# Patient Record
Sex: MALE | Race: BLACK OR AFRICAN AMERICAN | NOT HISPANIC OR LATINO | URBAN - METROPOLITAN AREA
[De-identification: names, ages, dates, MRNs, and addresses within clinical notes are randomized per-mention and may not be internally consistent; named-entity substitution may affect disease eponyms.]

---

## 2023-09-27 PROBLEM — M51.9 LUMBAR DISC DISEASE: Status: ACTIVE | Noted: 2023-09-27

## 2023-09-27 PROBLEM — M25.562 KNEE PAIN, BILATERAL: Status: ACTIVE | Noted: 2023-09-27

## 2023-09-27 PROBLEM — M16.0 PRIMARY LOCALIZED OSTEOARTHRITIS OF HIPS, BILATERAL: Status: ACTIVE | Noted: 2023-09-27

## 2023-09-27 PROBLEM — M17.0 PRIMARY OSTEOARTHRITIS OF BOTH KNEES: Status: ACTIVE | Noted: 2023-09-27

## 2023-09-27 PROBLEM — M25.561 KNEE PAIN, BILATERAL: Status: ACTIVE | Noted: 2023-09-27

## 2023-09-27 PROBLEM — M54.16 LUMBAR RADICULOPATHY: Status: ACTIVE | Noted: 2023-09-27

## 2023-09-27 RX ORDER — METHYLPREDNISOLONE 4 MG/1
TABLET ORAL
COMMUNITY
Start: 2022-07-27 | End: 2024-05-17 | Stop reason: WASHOUT

## 2023-09-27 RX ORDER — BLOOD SUGAR DIAGNOSTIC
STRIP MISCELLANEOUS
COMMUNITY
Start: 2021-05-18 | End: 2024-05-17 | Stop reason: WASHOUT

## 2023-09-27 RX ORDER — AMOXICILLIN AND CLAVULANATE POTASSIUM 875; 125 MG/1; MG/1
TABLET, FILM COATED ORAL
COMMUNITY
Start: 2019-12-23 | End: 2024-05-17 | Stop reason: WASHOUT

## 2023-09-27 RX ORDER — AMLODIPINE BESYLATE 10 MG/1
1 TABLET ORAL DAILY
COMMUNITY
Start: 2019-11-05

## 2023-09-27 RX ORDER — LANCETS 33 GAUGE
EACH MISCELLANEOUS
COMMUNITY
End: 2024-05-17 | Stop reason: WASHOUT

## 2023-09-27 RX ORDER — GUAIFENESIN 600 MG/1
600 TABLET, EXTENDED RELEASE ORAL 2 TIMES DAILY PRN
Status: ON HOLD | COMMUNITY
Start: 2019-12-23 | End: 2024-05-18

## 2023-09-27 RX ORDER — ACETAMINOPHEN 325 MG/1
975 TABLET ORAL EVERY 8 HOURS PRN
COMMUNITY
Start: 2019-12-23

## 2023-09-27 RX ORDER — PEN NEEDLE, DIABETIC 30 GX3/16"
NEEDLE, DISPOSABLE MISCELLANEOUS
COMMUNITY
End: 2024-05-17 | Stop reason: WASHOUT

## 2023-09-27 RX ORDER — IBUPROFEN 200 MG
400 TABLET ORAL EVERY 6 HOURS PRN
COMMUNITY
Start: 2019-09-19

## 2023-09-27 RX ORDER — SILDENAFIL 100 MG/1
100 TABLET, FILM COATED ORAL DAILY PRN
COMMUNITY
Start: 2019-12-04

## 2023-09-27 RX ORDER — LANOLIN ALCOHOL/MO/W.PET/CERES
400 CREAM (GRAM) TOPICAL DAILY
COMMUNITY
Start: 2020-12-30

## 2023-09-27 RX ORDER — MELOXICAM 15 MG/1
1 TABLET ORAL DAILY PRN
COMMUNITY
Start: 2022-06-01

## 2023-09-27 RX ORDER — ISOPROPYL ALCOHOL 70 ML/100ML
SWAB TOPICAL
COMMUNITY
End: 2024-05-17 | Stop reason: WASHOUT

## 2023-09-27 RX ORDER — LOSARTAN POTASSIUM 25 MG/1
25 TABLET ORAL DAILY
COMMUNITY
Start: 2020-12-30

## 2023-09-27 RX ORDER — METFORMIN HYDROCHLORIDE 1000 MG/1
TABLET ORAL
COMMUNITY
Start: 2020-03-19 | End: 2024-05-17 | Stop reason: WASHOUT

## 2023-09-27 RX ORDER — ATORVASTATIN CALCIUM 40 MG/1
40 TABLET, FILM COATED ORAL DAILY
COMMUNITY
Start: 2019-11-05

## 2023-09-27 RX ORDER — TIZANIDINE 4 MG/1
4 TABLET ORAL EVERY 6 HOURS PRN
COMMUNITY
Start: 2022-06-01 | End: 2024-05-17 | Stop reason: WASHOUT

## 2023-09-27 RX ORDER — LIRAGLUTIDE 6 MG/ML
1.8 INJECTION SUBCUTANEOUS DAILY
COMMUNITY
Start: 2021-02-09

## 2023-09-27 RX ORDER — CYCLOBENZAPRINE HCL 10 MG
10 TABLET ORAL 3 TIMES DAILY PRN
COMMUNITY
Start: 2019-09-19

## 2023-09-27 RX ORDER — GLIPIZIDE 5 MG/1
5 TABLET ORAL
COMMUNITY
Start: 2020-12-14

## 2023-10-02 ENCOUNTER — APPOINTMENT (OUTPATIENT)
Dept: ORTHOPEDIC SURGERY | Facility: HOSPITAL | Age: 58
End: 2023-10-02
Payer: COMMERCIAL

## 2024-01-22 ENCOUNTER — OFFICE VISIT (OUTPATIENT)
Dept: ORTHOPEDIC SURGERY | Facility: HOSPITAL | Age: 59
End: 2024-01-22
Payer: COMMERCIAL

## 2024-01-22 DIAGNOSIS — M17.0 PRIMARY OSTEOARTHRITIS OF BOTH KNEES: Primary | ICD-10-CM

## 2024-01-22 PROCEDURE — 2500000004 HC RX 250 GENERAL PHARMACY W/ HCPCS (ALT 636 FOR OP/ED): Performed by: PHYSICIAN ASSISTANT

## 2024-01-22 PROCEDURE — 2500000005 HC RX 250 GENERAL PHARMACY W/O HCPCS: Performed by: PHYSICIAN ASSISTANT

## 2024-01-22 PROCEDURE — 99214 OFFICE O/P EST MOD 30 MIN: CPT | Performed by: PHYSICIAN ASSISTANT

## 2024-01-31 PROCEDURE — 20610 DRAIN/INJ JOINT/BURSA W/O US: CPT | Performed by: PHYSICIAN ASSISTANT

## 2024-01-31 RX ORDER — LIDOCAINE HYDROCHLORIDE 10 MG/ML
5 INJECTION INFILTRATION; PERINEURAL
Status: COMPLETED | OUTPATIENT
Start: 2024-01-31 | End: 2024-01-31

## 2024-01-31 RX ORDER — TRIAMCINOLONE ACETONIDE 40 MG/ML
1 INJECTION, SUSPENSION INTRA-ARTICULAR; INTRAMUSCULAR
Status: COMPLETED | OUTPATIENT
Start: 2024-01-31 | End: 2024-01-31

## 2024-01-31 RX ADMIN — LIDOCAINE HYDROCHLORIDE 5 ML: 10 INJECTION, SOLUTION INFILTRATION; PERINEURAL at 17:41

## 2024-01-31 RX ADMIN — TRIAMCINOLONE ACETONIDE 1 ML: 40 INJECTION, SUSPENSION INTRA-ARTICULAR; INTRAMUSCULAR at 17:41

## 2024-01-31 NOTE — PROGRESS NOTES
Patient is a 58 y.o. male with medical history significant for   Patient Active Problem List   Diagnosis    Knee pain, bilateral    Lumbar disc disease    Lumbar radiculopathy    Primary localized osteoarthritis of hips, bilateral    Primary osteoarthritis of both knees    who presents for follow up of his bilateral knee pain due to arthritis.  Patient states that this has caused pain for several years with symptoms continuing to worsen.  The patient rates this pain 8/10 on the pain scale and states the symptoms are better with rest and cortisone injections; worse with prolonged activities including walking, standing, stair climbing, but he does remain as active as he can with his adopted young children.  The patient denies recent injury or trauma to the knees,  denies locking or catching, denies fever/chills, N/T or calf pain. The patient has had cortisone injections in the past with good relief of their symptoms.     ROS: All other systems have been reviewed and are negative except as previously noted in history of present illness.    Gen: The patient is alert and oriented ×3, is in no acute distress, and appear their stated age and weight.  Psychiatric: Mood and affect are appropriate.  Eyes: Sclera are white, and pupils are round and symmetric.  ENT: Mucous membranes are moist.   Neck: Supple. Thyroid is midline.  Respiratory: Respirations are nonlabored, chest rise is symmetric.  Cardiac: Rate is regular by palpation of distal pulses.   Abdomen: Nondistended.  Integument: No obvious cutaneous lesions are noted. No signs of lymphangitis. No signs of systemic edema.    Musculoskeletal: BAYRON knees  skin intact, no wounds or breakdown noted  mild lower leg edema  TTP joint line lateral knees, L worse than R with valgus angulation  no knee effusion noted  compartments soft  no calf tenderness  sensation intact to light touch  motor intact including TA/GS/EHL  palpable DP/PT pulses 2+  stable to valgus/varus stress  exams at 30 degrees of flexion  negative Lachmans and posterior drawer  Positive patellar crepitus  knee motion: 3-110 degrees     XR: Images of BAYRON knee reviewed personally by me today and reveal tricompartmental arthritis with joint space narrowing noted in lateral compartment, osteophyte formation and valgus angulation. The patient's recent knee imaging can be classified as a Grade 4 on the Kellgren Zi Scale for radiographic classification of osteoarthritis. Grade 4 is severe knee OA with large osteophytes, marked narrowing of joint space, severe sclerosis and definite deformity of bone ends.     IMP:  Problem List Items Addressed This Visit       Primary osteoarthritis of both knees - Primary          DISCUSSION: I discussed the conservative treatment options for osteoarthritis including physical therapy, NSAIDs and corticosteroid injection and briefly discussed indications for surgery. Patient should try to delay joint replacement surgery for as long as possible. If the patient's joint problem affects their quality of life and cause significant restriction of their activities, they may want to consider joint replacement surgery. I reviewed the importance for adopting a low impact lifestyle and avoidance of joint overloading activities. I explained the risks and benefits of the injection.  Patient verbalized understanding and wishes to proceed with cortisone injection for the bilateral knees again today.     Patient ID: Winston Garvin Jr. is a 58 y.o. male.    L Inj/Asp: R knee on 1/31/2024 5:41 PM  Indications: pain  Details: 21 G needle, anterolateral approach  Medications: 5 mL lidocaine 10 mg/mL (1 %); 1 mL triamcinolone acetonide 40 mg/mL  Outcome: tolerated well, no immediate complications  Procedure, treatment alternatives, risks and benefits explained, specific risks discussed. Consent was given by the patient. Immediately prior to procedure a time out was called to verify the correct patient,  procedure, equipment, support staff and site/side marked as required. Patient was prepped and draped in the usual sterile fashion.       L Inj/Asp: L knee on 1/31/2024 5:41 PM  Indications: pain  Details: 21 G needle, anterolateral approach  Medications: 5 mL lidocaine 10 mg/mL (1 %); 1 mL triamcinolone acetonide 40 mg/mL  Outcome: tolerated well, no immediate complications  Consent was given by the patient. Immediately prior to procedure a time out was called to verify the correct patient, procedure, equipment, support staff and site/side marked as required. Patient was prepped and draped in the usual sterile fashion.         PLAN:  Patient should observe for signs of infection and/or adverse reactions (redness, significant increase in pain, fever, nausea or vomiting) after receiving an injection today. If you develop any of the above symptoms, please contact my office. Patient should see the maximum effect in 3 to 5 days and can receive another cortisone injection no sooner than 3 months from today. Patient will continue with activities as tolerated. Mobilize to prevent stiffness. Take Tylenol as needed.  Follow up in 3 months, no x-rays needed. All questions were answered.

## 2024-04-22 ENCOUNTER — APPOINTMENT (OUTPATIENT)
Dept: ORTHOPEDIC SURGERY | Facility: HOSPITAL | Age: 59
End: 2024-04-22
Payer: COMMERCIAL

## 2024-04-23 ENCOUNTER — OFFICE VISIT (OUTPATIENT)
Dept: ORTHOPEDIC SURGERY | Facility: HOSPITAL | Age: 59
End: 2024-04-23
Payer: COMMERCIAL

## 2024-04-23 DIAGNOSIS — M17.0 PRIMARY OSTEOARTHRITIS OF BOTH KNEES: Primary | ICD-10-CM

## 2024-04-23 PROCEDURE — 20610 DRAIN/INJ JOINT/BURSA W/O US: CPT | Mod: LT | Performed by: PHYSICIAN ASSISTANT

## 2024-04-23 PROCEDURE — 2500000005 HC RX 250 GENERAL PHARMACY W/O HCPCS: Performed by: PHYSICIAN ASSISTANT

## 2024-04-23 PROCEDURE — 99214 OFFICE O/P EST MOD 30 MIN: CPT | Performed by: PHYSICIAN ASSISTANT

## 2024-04-23 PROCEDURE — 2500000004 HC RX 250 GENERAL PHARMACY W/ HCPCS (ALT 636 FOR OP/ED): Performed by: PHYSICIAN ASSISTANT

## 2024-04-23 PROCEDURE — 20610 DRAIN/INJ JOINT/BURSA W/O US: CPT | Mod: RT | Performed by: PHYSICIAN ASSISTANT

## 2024-05-08 PROCEDURE — 20610 DRAIN/INJ JOINT/BURSA W/O US: CPT | Performed by: PHYSICIAN ASSISTANT

## 2024-05-08 RX ORDER — LIDOCAINE HYDROCHLORIDE 10 MG/ML
5 INJECTION INFILTRATION; PERINEURAL
Status: COMPLETED | OUTPATIENT
Start: 2024-05-08 | End: 2024-05-08

## 2024-05-08 RX ORDER — TRIAMCINOLONE ACETONIDE 40 MG/ML
1 INJECTION, SUSPENSION INTRA-ARTICULAR; INTRAMUSCULAR
Status: COMPLETED | OUTPATIENT
Start: 2024-05-08 | End: 2024-05-08

## 2024-05-08 RX ADMIN — LIDOCAINE HYDROCHLORIDE 5 ML: 10 INJECTION, SOLUTION INFILTRATION; PERINEURAL at 13:33

## 2024-05-08 RX ADMIN — TRIAMCINOLONE ACETONIDE 1 ML: 40 INJECTION, SUSPENSION INTRA-ARTICULAR; INTRAMUSCULAR at 13:34

## 2024-05-08 RX ADMIN — LIDOCAINE HYDROCHLORIDE 5 ML: 10 INJECTION, SOLUTION INFILTRATION; PERINEURAL at 13:34

## 2024-05-08 RX ADMIN — TRIAMCINOLONE ACETONIDE 1 ML: 40 INJECTION, SUSPENSION INTRA-ARTICULAR; INTRAMUSCULAR at 13:33

## 2024-05-08 NOTE — PROGRESS NOTES
Patient is a 59 y.o. male with medical history significant for   Patient Active Problem List   Diagnosis    Knee pain, bilateral    Lumbar disc disease    Lumbar radiculopathy    Primary localized osteoarthritis of hips, bilateral    Primary osteoarthritis of both knees    who presents for follow up of his bilateral knee pain due to arthritis.  Patient states that this has caused pain for several years with symptoms continuing to worsen.  The patient rates this pain 8/10 on the pain scale and states the symptoms are better with rest and cortisone injections; worse with daily activities including walking and stair climbing, but he does remain as active as he can with his adopted young children.  He enjoys swimming with them over the summer break.  The patient denies recent injury or trauma to the knees,  denies locking or catching, denies fever/chills, N/T or calf pain. The patient has had cortisone injections in the past with good relief of their symptoms.     ROS: All other systems have been reviewed and are negative except as previously noted in history of present illness.    Gen: The patient is alert and oriented ×3, is in no acute distress, and appear their stated age and weight.  Psychiatric: Mood and affect are appropriate.  Eyes: Sclera are white, and pupils are round and symmetric.  ENT: Mucous membranes are moist.   Neck: Supple. Thyroid is midline.  Respiratory: Respirations are nonlabored, chest rise is symmetric.  Cardiac: Rate is regular by palpation of distal pulses.   Abdomen: Nondistended.  Integument: No obvious cutaneous lesions are noted. No signs of lymphangitis. No signs of systemic edema.    Musculoskeletal: BAYRON knees  skin intact, no wounds or breakdown noted  mild lower leg edema  TTP joint line lateral knees, L worse than R with valgus angulation  no knee effusion noted  compartments soft  no calf tenderness  sensation intact to light touch  motor intact including TA/GS/EHL  palpable DP/PT  pulses 2+  stable to valgus/varus stress exams at 30 degrees of flexion  negative Lachmans and posterior drawer  Positive patellar crepitus  knee motion: 3-110 degrees     XR: Prior images of BAYRON knee reviewed personally by me today and reveal tricompartmental arthritis with joint space narrowing noted in lateral compartment, osteophyte formation and valgus angulation. The patient's recent knee imaging can be classified as a Grade 4 on the Kellgren Zi Scale for radiographic classification of osteoarthritis. Grade 4 is severe knee OA with large osteophytes, marked narrowing of joint space, severe sclerosis and definite deformity of bone ends.     IMP:  Problem List Items Addressed This Visit       Primary osteoarthritis of both knees - Primary             DISCUSSION: I discussed the conservative treatment options for osteoarthritis including physical therapy, NSAIDs and corticosteroid injection and briefly discussed indications for surgery. Patient should try to delay joint replacement surgery for as long as possible. If the patient's joint problem affects their quality of life and cause significant restriction of their activities, they may want to consider joint replacement surgery. I reviewed the importance for adopting a low impact lifestyle and avoidance of joint overloading activities. I explained the risks and benefits of the injection.  Patient verbalized understanding and wishes to proceed with cortisone injection for the bilateral knees again today.     Patient ID: Winston Garvin Jr. is a 59 y.o. male.    L Inj/Asp: R knee on 5/8/2024 1:33 PM  Indications: pain  Details: 21 G needle, anterolateral approach  Medications: 5 mL lidocaine 10 mg/mL (1 %); 1 mL triamcinolone acetonide 40 mg/mL  Outcome: tolerated well, no immediate complications  Procedure, treatment alternatives, risks and benefits explained, specific risks discussed. Consent was given by the patient. Immediately prior to procedure a time out  was called to verify the correct patient, procedure, equipment, support staff and site/side marked as required. Patient was prepped and draped in the usual sterile fashion.       L Inj/Asp: L knee on 5/8/2024 1:34 PM  Indications: pain  Details: 21 G needle, anterolateral approach  Medications: 5 mL lidocaine 10 mg/mL (1 %); 1 mL triamcinolone acetonide 40 mg/mL  Outcome: tolerated well, no immediate complications  Consent was given by the patient. Immediately prior to procedure a time out was called to verify the correct patient, procedure, equipment, support staff and site/side marked as required. Patient was prepped and draped in the usual sterile fashion.         PLAN:  Patient should observe for signs of infection and/or adverse reactions (redness, significant increase in pain, fever, nausea or vomiting) after receiving an injection today. If you develop any of the above symptoms, please contact my office. Patient should see the maximum effect in 3 to 5 days and can receive another cortisone injection no sooner than 3 months from today. Patient will continue with activities as tolerated and swimming over the summer is good exercise. Take Tylenol as needed.  Follow up in 3 months, no x-rays needed. All questions were answered.

## 2024-05-16 ENCOUNTER — APPOINTMENT (OUTPATIENT)
Dept: RADIOLOGY | Facility: HOSPITAL | Age: 59
End: 2024-05-16
Payer: COMMERCIAL

## 2024-05-16 ENCOUNTER — CLINICAL SUPPORT (OUTPATIENT)
Dept: EMERGENCY MEDICINE | Facility: HOSPITAL | Age: 59
End: 2024-05-16
Payer: COMMERCIAL

## 2024-05-16 ENCOUNTER — HOSPITAL ENCOUNTER (INPATIENT)
Facility: HOSPITAL | Age: 59
LOS: 2 days | Discharge: HOME | End: 2024-05-18
Attending: EMERGENCY MEDICINE | Admitting: STUDENT IN AN ORGANIZED HEALTH CARE EDUCATION/TRAINING PROGRAM
Payer: COMMERCIAL

## 2024-05-16 DIAGNOSIS — J18.9 PNEUMONIA OF RIGHT LOWER LOBE DUE TO INFECTIOUS ORGANISM: Primary | ICD-10-CM

## 2024-05-16 DIAGNOSIS — R07.9 CHEST PAIN, UNSPECIFIED TYPE: ICD-10-CM

## 2024-05-16 DIAGNOSIS — J96.01 ACUTE HYPOXIC RESPIRATORY FAILURE (MULTI): ICD-10-CM

## 2024-05-16 DIAGNOSIS — R06.02 SHORTNESS OF BREATH: ICD-10-CM

## 2024-05-16 LAB
ANION GAP SERPL CALC-SCNC: 18 MMOL/L (ref 10–20)
ANION GAP SERPL CALC-SCNC: 20 MMOL/L (ref 10–20)
BASOPHILS # BLD AUTO: 0.02 X10*3/UL (ref 0–0.1)
BASOPHILS NFR BLD AUTO: 0.2 %
BNP SERPL-MCNC: 4 PG/ML (ref 0–99)
BUN SERPL-MCNC: 22 MG/DL (ref 6–23)
BUN SERPL-MCNC: 22 MG/DL (ref 6–23)
CALCIUM SERPL-MCNC: 8.8 MG/DL (ref 8.6–10.6)
CALCIUM SERPL-MCNC: 8.9 MG/DL (ref 8.6–10.6)
CARDIAC TROPONIN I PNL SERPL HS: 5 NG/L (ref 0–53)
CHLORIDE SERPL-SCNC: 101 MMOL/L (ref 98–107)
CHLORIDE SERPL-SCNC: 103 MMOL/L (ref 98–107)
CO2 SERPL-SCNC: 20 MMOL/L (ref 21–32)
CO2 SERPL-SCNC: 21 MMOL/L (ref 21–32)
CREAT SERPL-MCNC: 0.91 MG/DL (ref 0.5–1.3)
CREAT SERPL-MCNC: 1.09 MG/DL (ref 0.5–1.3)
D DIMER PPP FEU-MCNC: 1202 NG/ML FEU
EGFRCR SERPLBLD CKD-EPI 2021: 77 ML/MIN/1.73M*2
EGFRCR SERPLBLD CKD-EPI 2021: >90 ML/MIN/1.73M*2
EOSINOPHIL # BLD AUTO: 0 X10*3/UL (ref 0–0.7)
EOSINOPHIL NFR BLD AUTO: 0 %
ERYTHROCYTE [DISTWIDTH] IN BLOOD BY AUTOMATED COUNT: 13.3 % (ref 11.5–14.5)
GLUCOSE SERPL-MCNC: 246 MG/DL (ref 74–99)
GLUCOSE SERPL-MCNC: 260 MG/DL (ref 74–99)
HCT VFR BLD AUTO: 35.5 % (ref 41–52)
HGB BLD-MCNC: 11.3 G/DL (ref 13.5–17.5)
IMM GRANULOCYTES # BLD AUTO: 0.03 X10*3/UL (ref 0–0.7)
IMM GRANULOCYTES NFR BLD AUTO: 0.3 % (ref 0–0.9)
LYMPHOCYTES # BLD AUTO: 0.63 X10*3/UL (ref 1.2–4.8)
LYMPHOCYTES NFR BLD AUTO: 5.6 %
MAGNESIUM SERPL-MCNC: 2.83 MG/DL (ref 1.6–2.4)
MCH RBC QN AUTO: 26.8 PG (ref 26–34)
MCHC RBC AUTO-ENTMCNC: 31.8 G/DL (ref 32–36)
MCV RBC AUTO: 84 FL (ref 80–100)
MONOCYTES # BLD AUTO: 0.07 X10*3/UL (ref 0.1–1)
MONOCYTES NFR BLD AUTO: 0.6 %
NEUTROPHILS # BLD AUTO: 10.53 X10*3/UL (ref 1.2–7.7)
NEUTROPHILS NFR BLD AUTO: 93.3 %
NRBC BLD-RTO: 0 /100 WBCS (ref 0–0)
PLATELET # BLD AUTO: 186 X10*3/UL (ref 150–450)
POTASSIUM SERPL-SCNC: 4.8 MMOL/L (ref 3.5–5.3)
POTASSIUM SERPL-SCNC: 8.7 MMOL/L (ref 3.5–5.3)
RBC # BLD AUTO: 4.21 X10*6/UL (ref 4.5–5.9)
SODIUM SERPL-SCNC: 132 MMOL/L (ref 136–145)
SODIUM SERPL-SCNC: 137 MMOL/L (ref 136–145)
WBC # BLD AUTO: 11.3 X10*3/UL (ref 4.4–11.3)

## 2024-05-16 PROCEDURE — 85025 COMPLETE CBC W/AUTO DIFF WBC: CPT

## 2024-05-16 PROCEDURE — 1210000001 HC SEMI-PRIVATE ROOM DAILY

## 2024-05-16 PROCEDURE — 2500000004 HC RX 250 GENERAL PHARMACY W/ HCPCS (ALT 636 FOR OP/ED): Mod: SE

## 2024-05-16 PROCEDURE — 85379 FIBRIN DEGRADATION QUANT: CPT

## 2024-05-16 PROCEDURE — 93010 ELECTROCARDIOGRAM REPORT: CPT | Performed by: EMERGENCY MEDICINE

## 2024-05-16 PROCEDURE — 76604 US EXAM CHEST: CPT

## 2024-05-16 PROCEDURE — 36415 COLL VENOUS BLD VENIPUNCTURE: CPT

## 2024-05-16 PROCEDURE — 87081 CULTURE SCREEN ONLY: CPT | Performed by: STUDENT IN AN ORGANIZED HEALTH CARE EDUCATION/TRAINING PROGRAM

## 2024-05-16 PROCEDURE — 71275 CT ANGIOGRAPHY CHEST: CPT

## 2024-05-16 PROCEDURE — 2550000001 HC RX 255 CONTRASTS: Mod: SE | Performed by: EMERGENCY MEDICINE

## 2024-05-16 PROCEDURE — 76604 US EXAM CHEST: CPT | Performed by: EMERGENCY MEDICINE

## 2024-05-16 PROCEDURE — 83735 ASSAY OF MAGNESIUM: CPT

## 2024-05-16 PROCEDURE — 96367 TX/PROPH/DG ADDL SEQ IV INF: CPT

## 2024-05-16 PROCEDURE — 87449 NOS EACH ORGANISM AG IA: CPT | Performed by: STUDENT IN AN ORGANIZED HEALTH CARE EDUCATION/TRAINING PROGRAM

## 2024-05-16 PROCEDURE — 93308 TTE F-UP OR LMTD: CPT | Performed by: EMERGENCY MEDICINE

## 2024-05-16 PROCEDURE — 96365 THER/PROPH/DIAG IV INF INIT: CPT | Mod: 59

## 2024-05-16 PROCEDURE — 83880 ASSAY OF NATRIURETIC PEPTIDE: CPT

## 2024-05-16 PROCEDURE — 93005 ELECTROCARDIOGRAM TRACING: CPT

## 2024-05-16 PROCEDURE — 84145 PROCALCITONIN (PCT): CPT | Performed by: STUDENT IN AN ORGANIZED HEALTH CARE EDUCATION/TRAINING PROGRAM

## 2024-05-16 PROCEDURE — 87899 AGENT NOS ASSAY W/OPTIC: CPT | Performed by: STUDENT IN AN ORGANIZED HEALTH CARE EDUCATION/TRAINING PROGRAM

## 2024-05-16 PROCEDURE — 87632 RESP VIRUS 6-11 TARGETS: CPT | Performed by: STUDENT IN AN ORGANIZED HEALTH CARE EDUCATION/TRAINING PROGRAM

## 2024-05-16 PROCEDURE — 80048 BASIC METABOLIC PNL TOTAL CA: CPT

## 2024-05-16 PROCEDURE — 99285 EMERGENCY DEPT VISIT HI MDM: CPT | Performed by: EMERGENCY MEDICINE

## 2024-05-16 PROCEDURE — 2500000001 HC RX 250 WO HCPCS SELF ADMINISTERED DRUGS (ALT 637 FOR MEDICARE OP): Performed by: STUDENT IN AN ORGANIZED HEALTH CARE EDUCATION/TRAINING PROGRAM

## 2024-05-16 PROCEDURE — 71046 X-RAY EXAM CHEST 2 VIEWS: CPT | Performed by: STUDENT IN AN ORGANIZED HEALTH CARE EDUCATION/TRAINING PROGRAM

## 2024-05-16 PROCEDURE — 71275 CT ANGIOGRAPHY CHEST: CPT | Performed by: RADIOLOGY

## 2024-05-16 PROCEDURE — 99285 EMERGENCY DEPT VISIT HI MDM: CPT | Mod: 25

## 2024-05-16 PROCEDURE — 71046 X-RAY EXAM CHEST 2 VIEWS: CPT

## 2024-05-16 PROCEDURE — 84484 ASSAY OF TROPONIN QUANT: CPT

## 2024-05-16 RX ORDER — CEFTRIAXONE 2 G/50ML
2 INJECTION, SOLUTION INTRAVENOUS EVERY 24 HOURS
Status: DISCONTINUED | OUTPATIENT
Start: 2024-05-16 | End: 2024-05-17

## 2024-05-16 RX ORDER — SENNOSIDES 8.6 MG/1
2 TABLET ORAL 2 TIMES DAILY PRN
Status: DISCONTINUED | OUTPATIENT
Start: 2024-05-16 | End: 2024-05-18 | Stop reason: HOSPADM

## 2024-05-16 RX ORDER — ATORVASTATIN CALCIUM 40 MG/1
40 TABLET, FILM COATED ORAL NIGHTLY
Status: DISCONTINUED | OUTPATIENT
Start: 2024-05-16 | End: 2024-05-18 | Stop reason: HOSPADM

## 2024-05-16 RX ORDER — CEFTRIAXONE 2 G/50ML
2 INJECTION, SOLUTION INTRAVENOUS ONCE
Status: COMPLETED | OUTPATIENT
Start: 2024-05-16 | End: 2024-05-16

## 2024-05-16 RX ORDER — DEXTROSE 50 % IN WATER (D50W) INTRAVENOUS SYRINGE
25
Status: DISCONTINUED | OUTPATIENT
Start: 2024-05-16 | End: 2024-05-18 | Stop reason: HOSPADM

## 2024-05-16 RX ORDER — LOSARTAN POTASSIUM 25 MG/1
25 TABLET ORAL DAILY
Status: DISCONTINUED | OUTPATIENT
Start: 2024-05-17 | End: 2024-05-18 | Stop reason: HOSPADM

## 2024-05-16 RX ORDER — INSULIN LISPRO 100 [IU]/ML
0-5 INJECTION, SOLUTION INTRAVENOUS; SUBCUTANEOUS
Status: DISCONTINUED | OUTPATIENT
Start: 2024-05-17 | End: 2024-05-18 | Stop reason: HOSPADM

## 2024-05-16 RX ORDER — ACETAMINOPHEN 325 MG/1
650 TABLET ORAL 4 TIMES DAILY PRN
Status: DISCONTINUED | OUTPATIENT
Start: 2024-05-16 | End: 2024-05-18 | Stop reason: HOSPADM

## 2024-05-16 RX ORDER — ENOXAPARIN SODIUM 100 MG/ML
40 INJECTION SUBCUTANEOUS DAILY
Status: DISCONTINUED | OUTPATIENT
Start: 2024-05-17 | End: 2024-05-18 | Stop reason: HOSPADM

## 2024-05-16 RX ORDER — DEXTROSE 50 % IN WATER (D50W) INTRAVENOUS SYRINGE
12.5
Status: DISCONTINUED | OUTPATIENT
Start: 2024-05-16 | End: 2024-05-18 | Stop reason: HOSPADM

## 2024-05-16 RX ORDER — GUAIFENESIN 600 MG/1
600 TABLET, EXTENDED RELEASE ORAL 2 TIMES DAILY PRN
Status: DISCONTINUED | OUTPATIENT
Start: 2024-05-16 | End: 2024-05-18 | Stop reason: HOSPADM

## 2024-05-16 RX ORDER — AMLODIPINE BESYLATE 10 MG/1
10 TABLET ORAL DAILY
Status: DISCONTINUED | OUTPATIENT
Start: 2024-05-17 | End: 2024-05-18 | Stop reason: HOSPADM

## 2024-05-16 RX ADMIN — AZITHROMYCIN 500 MG: 500 INJECTION, POWDER, LYOPHILIZED, FOR SOLUTION INTRAVENOUS at 21:20

## 2024-05-16 RX ADMIN — IOHEXOL 75 ML: 350 INJECTION, SOLUTION INTRAVENOUS at 19:55

## 2024-05-16 RX ADMIN — CEFTRIAXONE SODIUM 2 G: 2 INJECTION, SOLUTION INTRAVENOUS at 20:56

## 2024-05-16 RX ADMIN — ATORVASTATIN CALCIUM 40 MG: 40 TABLET, FILM COATED ORAL at 22:44

## 2024-05-16 ASSESSMENT — PAIN - FUNCTIONAL ASSESSMENT: PAIN_FUNCTIONAL_ASSESSMENT: 0-10

## 2024-05-16 ASSESSMENT — ENCOUNTER SYMPTOMS
DIZZINESS: 0
SORE THROAT: 1
VOMITING: 0
DYSURIA: 0
FREQUENCY: 0
CHEST TIGHTNESS: 1
APPETITE CHANGE: 0
NECK STIFFNESS: 0
SINUS PAIN: 0
PALPITATIONS: 0
RHINORRHEA: 0
COUGH: 1
JOINT SWELLING: 0
FEVER: 1
NUMBNESS: 0
ARTHRALGIAS: 0
CHILLS: 1
LIGHT-HEADEDNESS: 0
HEADACHES: 0
WHEEZING: 0
ABDOMINAL DISTENTION: 0
SHORTNESS OF BREATH: 1
DIARRHEA: 0
DIAPHORESIS: 1
NAUSEA: 0
CONSTIPATION: 0
COLOR CHANGE: 0
SEIZURES: 0

## 2024-05-16 ASSESSMENT — COLUMBIA-SUICIDE SEVERITY RATING SCALE - C-SSRS
6. HAVE YOU EVER DONE ANYTHING, STARTED TO DO ANYTHING, OR PREPARED TO DO ANYTHING TO END YOUR LIFE?: NO
1. IN THE PAST MONTH, HAVE YOU WISHED YOU WERE DEAD OR WISHED YOU COULD GO TO SLEEP AND NOT WAKE UP?: NO
2. HAVE YOU ACTUALLY HAD ANY THOUGHTS OF KILLING YOURSELF?: NO

## 2024-05-16 ASSESSMENT — LIFESTYLE VARIABLES
HAVE YOU EVER FELT YOU SHOULD CUT DOWN ON YOUR DRINKING: NO
EVER FELT BAD OR GUILTY ABOUT YOUR DRINKING: NO
HAVE PEOPLE ANNOYED YOU BY CRITICIZING YOUR DRINKING: NO
TOTAL SCORE: 0
EVER HAD A DRINK FIRST THING IN THE MORNING TO STEADY YOUR NERVES TO GET RID OF A HANGOVER: NO

## 2024-05-16 ASSESSMENT — PAIN DESCRIPTION - DESCRIPTORS: DESCRIPTORS: DISCOMFORT

## 2024-05-16 ASSESSMENT — PAIN DESCRIPTION - PROGRESSION: CLINICAL_PROGRESSION: NOT CHANGED

## 2024-05-16 NOTE — ED PROVIDER NOTES
CC: Shortness of Breath and Rapid Heart Rate     History provided by: Patient  Limitations to History: None    HPI:  Patient is a 59-year-old male with a PMH of DM, HTN, HLD, and ZACHARIAH who presents to the emergency department for a chief complaint of shortness of breath and chest pain.  He reports that he has been progressively short of breath at rest and with exertion for 1 week.  Associated symptoms include left-sided parasternal chest pain that does not radiate and is intermittent in nature.  Patient reports subjective fever/chills at home.  He denies nausea, vomiting, abdominal pain, or lower extremity edema.  Patient does not wear at home oxygen.    He had a primary care appointment at [facility] where he was found to be saturating 87% on room air therefore he was placed on 4 L nasal cannula.  They provide him with a breathing treatment and attempted to call EMS for the patient.  He elected to present to [facility] ED.    External Records Reviewed: Previous ED records, inpatient records, and outpatient records  ???????????????????????????????????????????????????????????????  Triage Vitals:  T 36.6 °C (97.9 °F)  HR 72  /73  RR 18  O2 97 % Supplemental oxygen    Physical Exam  Constitutional:       General: He is awake. He is not in acute distress.     Appearance: He is not ill-appearing, toxic-appearing or diaphoretic.   Cardiovascular:      Rate and Rhythm: Tachycardia present.      Pulses:           Radial pulses are 2+ on the right side and 2+ on the left side.        Dorsalis pedis pulses are 2+ on the right side and 2+ on the left side.      Heart sounds: Normal heart sounds, S1 normal and S2 normal. Heart sounds not distant. No murmur heard.     No friction rub.   Pulmonary:      Effort: Tachypnea present.      Breath sounds: Examination of the right-lower field reveals wheezing. Wheezing present.   Abdominal:      General: Abdomen is protuberant. There is no distension.      Palpations:  Abdomen is soft.      Tenderness: There is no guarding or rebound.   Musculoskeletal:      Right lower leg: No edema.      Left lower leg: No edema.   Skin:     General: Skin is warm and dry.      Capillary Refill: Capillary refill takes less than 2 seconds.   Neurological:      Mental Status: He is alert.   Psychiatric:         Behavior: Behavior is cooperative.        ???????????????????????????????????????????????????????????????  ED Course/Treatment/Medical Decision Making    EKG Interpretation:   Normal sinus rhythm. Rate of 97 bpm. Normal axis. Normal intervals. No acute ST elevations, depressions, or T wave inversions.  No previous EKGs to compare to.    Independent Interpretation of Studies:  I independently interpreted: EKG, CT PE, and CXR    Differential diagnoses considered include but ar not limited to: Pneumonia, pneumothorax, aortic pathology, pulmonary embolism, new onset heart failure, pulmonary artery hypertension    Social Determinants Limiting Care:  None identified         ED Course:  Diagnoses as of 05/17/24 1622   Pneumonia of right lower lobe due to infectious organism   Acute hypoxic respiratory failure (Multi)   Shortness of breath   Chest pain, unspecified type       MDM:  Patient is a 59-year-old male with above PMH who presents to the emergency department for a chief complaint of shortness of breath and chest pain.  Upon arrival patient's vital signs are remarkable for hypoxia, tachypnea, and tachycardia.  Upon examination the patient appears to be in mild respiratory distress and is nontoxic-appearing.  Patient's symptoms today are most likely related to right lobe pneumonia versus acute onset heart failure.  Given patient's hypoxia and tachycardia a D-dimer has been ordered.  EKG is nonischemic and troponin testing is within normal limits.  Low concern for aortic pathology today given no active chest pain radiating to the patient's back and pulses are equal in all extremities.  Chest  x-ray remarkable for possible left lower lobe opacity.  D-dimer is elevated therefore CT PE has been ordered.  CT PE remarkable for possible pulmonary artery hypertension, thyroid nodules, and right lower lobe multifocal pneumonia.  The patient will be started on IV ceftriaxone and azithromycin.  The patient was accepted for admission to internal medicine for further workup and management in stable condition.    Impression:  Pneumonia of right lower lobe  Acute hypoxic respiratory failure  Shortness of breath  Chest pain    Disposition:  Admit to internal medicine    Charlie Escobar DO   Emergency Medicine, PGY-1     Patient was staffed and discussed with ED attending Dr. Bonds    Procedures ? Smart[employer] last updated 5/16/2024 5:05 PM          Charlie Escobar DO  Resident  05/17/24 [id number]

## 2024-05-16 NOTE — ED PROCEDURE NOTE
Procedure    Performed by: Dejan Arcos DO  Authorized by: Philippe Bonds MD                Respiratory Indications: shortness of breath  Procedure: Cardiac Ultrasound    Findings:   Views: parasternal long, apical four and subxiphoid  Pericardial effusion: Exam equivocal for small pericardial effusion versus fat pad artifact on parasternal long axis, no large or circumferential effusion noted.  Activity: Ventricular contractions were visualized.  LV: LV systolic function was NORMAL.  RV: RV size was NORMAL.    Impression:  Cardiac: The focused cardiac ultrasound exam was NORMAL.  Procedure: Thoracic Ultrasound    Findings:  R Lung Sliding: The RIGHT chest was evaluated and LUNG SLIDING was visualized.  L Lung Sliding: The LEFT chest was evaluated and LUNG SLIDING was visualized.  R Effusion: The RIGHT chest was evaluated and there was NO PLEURAL EFFUSION.  L Effusion: The LEFT chest was evaluated and there was NO PLEURAL EFFUSION.  A-lines: The RIGHT chest was evaluated and multiple A-LINES were visualized  B-lines: The RIGHT chest was evaluated and there were NO B-LINES visualized  R Consolidation: The RIGHT chest was evaluated and there was NO RIGHT CONSOLIDATION.  L Consolidation: The LEFT chest was evaluated and there was NO LEFT CONSOLIDATION.    Impression:  Thorax: The focused thoracic ultrasound exam was NORMAL.                   Dejan Arcos DO  Resident  05/16/24 [id number]

## 2024-05-16 NOTE — ED TRIAGE NOTES
Pt to ED, sent in by PCP, for rapid heart rate. Pt was seen at [facility] earlier this week for SOB. Pt states he's feeling SOB today.     Pt was 87% on RA, up to 91% on 2L O2 via NC, and 95% on 4L O2 via NC.     Hx DM and HTN- compliant with meds.

## 2024-05-17 PROBLEM — I10 BENIGN ESSENTIAL HTN: Status: ACTIVE | Noted: 2024-05-17

## 2024-05-17 PROBLEM — E11.9 TYPE 2 DIABETES MELLITUS WITHOUT COMPLICATION (MULTI): Status: ACTIVE | Noted: 2024-05-17

## 2024-05-17 PROBLEM — J96.01 ACUTE HYPOXIC RESPIRATORY FAILURE (MULTI): Status: ACTIVE | Noted: 2024-05-17

## 2024-05-17 LAB
ALBUMIN SERPL BCP-MCNC: 3.4 G/DL (ref 3.4–5)
ALP SERPL-CCNC: 94 U/L (ref 33–136)
ALT SERPL W P-5'-P-CCNC: 16 U/L (ref 10–52)
ANION GAP SERPL CALC-SCNC: 15 MMOL/L (ref 10–20)
AST SERPL W P-5'-P-CCNC: 12 U/L (ref 9–39)
ATRIAL RATE: 97 BPM
BILIRUB SERPL-MCNC: 0.8 MG/DL (ref 0–1.2)
BUN SERPL-MCNC: 23 MG/DL (ref 6–23)
CALCIUM SERPL-MCNC: 8.4 MG/DL (ref 8.6–10.6)
CHLORIDE SERPL-SCNC: 104 MMOL/L (ref 98–107)
CO2 SERPL-SCNC: 23 MMOL/L (ref 21–32)
CREAT SERPL-MCNC: 1.2 MG/DL (ref 0.5–1.3)
EGFRCR SERPLBLD CKD-EPI 2021: 69 ML/MIN/1.73M*2
ERYTHROCYTE [DISTWIDTH] IN BLOOD BY AUTOMATED COUNT: 13.2 % (ref 11.5–14.5)
GLUCOSE BLD MANUAL STRIP-MCNC: 155 MG/DL (ref 74–99)
GLUCOSE BLD MANUAL STRIP-MCNC: 172 MG/DL (ref 74–99)
GLUCOSE BLD MANUAL STRIP-MCNC: 209 MG/DL (ref 74–99)
GLUCOSE SERPL-MCNC: 280 MG/DL (ref 74–99)
HCT VFR BLD AUTO: 32.7 % (ref 41–52)
HGB BLD-MCNC: 10.7 G/DL (ref 13.5–17.5)
LEGIONELLA AG UR QL: NEGATIVE
MAGNESIUM SERPL-MCNC: 2.52 MG/DL (ref 1.6–2.4)
MCH RBC QN AUTO: 27 PG (ref 26–34)
MCHC RBC AUTO-ENTMCNC: 32.7 G/DL (ref 32–36)
MCV RBC AUTO: 83 FL (ref 80–100)
NRBC BLD-RTO: 0 /100 WBCS (ref 0–0)
P AXIS: 73 DEGREES
P OFFSET: 200 MS
P ONSET: 152 MS
PLATELET # BLD AUTO: 180 X10*3/UL (ref 150–450)
POTASSIUM SERPL-SCNC: 4.5 MMOL/L (ref 3.5–5.3)
PR INTERVAL: 142 MS
PROCALCITONIN SERPL-MCNC: 0.1 NG/ML
PROT SERPL-MCNC: 6.4 G/DL (ref 6.4–8.2)
Q ONSET: 223 MS
QRS COUNT: 16 BEATS
QRS DURATION: 70 MS
QT INTERVAL: 342 MS
QTC CALCULATION(BAZETT): 434 MS
QTC FREDERICIA: 401 MS
R AXIS: -9 DEGREES
RBC # BLD AUTO: 3.96 X10*6/UL (ref 4.5–5.9)
S PNEUM AG UR QL: POSITIVE
SODIUM SERPL-SCNC: 137 MMOL/L (ref 136–145)
T AXIS: 71 DEGREES
T OFFSET: 394 MS
VENTRICULAR RATE: 97 BPM
WBC # BLD AUTO: 8.8 X10*3/UL (ref 4.4–11.3)

## 2024-05-17 PROCEDURE — 2500000001 HC RX 250 WO HCPCS SELF ADMINISTERED DRUGS (ALT 637 FOR MEDICARE OP): Performed by: STUDENT IN AN ORGANIZED HEALTH CARE EDUCATION/TRAINING PROGRAM

## 2024-05-17 PROCEDURE — 82947 ASSAY GLUCOSE BLOOD QUANT: CPT

## 2024-05-17 PROCEDURE — 84075 ASSAY ALKALINE PHOSPHATASE: CPT | Performed by: STUDENT IN AN ORGANIZED HEALTH CARE EDUCATION/TRAINING PROGRAM

## 2024-05-17 PROCEDURE — 36415 COLL VENOUS BLD VENIPUNCTURE: CPT | Performed by: STUDENT IN AN ORGANIZED HEALTH CARE EDUCATION/TRAINING PROGRAM

## 2024-05-17 PROCEDURE — 2500000006 HC RX 250 W HCPCS SELF ADMINISTERED DRUGS (ALT 637 FOR ALL PAYERS): Performed by: STUDENT IN AN ORGANIZED HEALTH CARE EDUCATION/TRAINING PROGRAM

## 2024-05-17 PROCEDURE — 2500000004 HC RX 250 GENERAL PHARMACY W/ HCPCS (ALT 636 FOR OP/ED): Performed by: STUDENT IN AN ORGANIZED HEALTH CARE EDUCATION/TRAINING PROGRAM

## 2024-05-17 PROCEDURE — 85027 COMPLETE CBC AUTOMATED: CPT | Performed by: STUDENT IN AN ORGANIZED HEALTH CARE EDUCATION/TRAINING PROGRAM

## 2024-05-17 PROCEDURE — 99232 SBSQ HOSP IP/OBS MODERATE 35: CPT | Performed by: STUDENT IN AN ORGANIZED HEALTH CARE EDUCATION/TRAINING PROGRAM

## 2024-05-17 PROCEDURE — 1100000001 HC PRIVATE ROOM DAILY

## 2024-05-17 PROCEDURE — 2500000002 HC RX 250 W HCPCS SELF ADMINISTERED DRUGS (ALT 637 FOR MEDICARE OP, ALT 636 FOR OP/ED): Performed by: STUDENT IN AN ORGANIZED HEALTH CARE EDUCATION/TRAINING PROGRAM

## 2024-05-17 PROCEDURE — 83735 ASSAY OF MAGNESIUM: CPT | Performed by: STUDENT IN AN ORGANIZED HEALTH CARE EDUCATION/TRAINING PROGRAM

## 2024-05-17 RX ORDER — BENZONATATE 100 MG/1
100 CAPSULE ORAL 3 TIMES DAILY PRN
COMMUNITY
Start: 2024-05-13 | End: 2024-05-27

## 2024-05-17 RX ORDER — AZITHROMYCIN 250 MG/1
500 TABLET, FILM COATED ORAL
Qty: 2 TABLET | Refills: 0 | Status: DISCONTINUED | OUTPATIENT
Start: 2024-05-17 | End: 2024-05-18 | Stop reason: HOSPADM

## 2024-05-17 RX ORDER — DOXYCYCLINE 100 MG/1
100 CAPSULE ORAL 2 TIMES DAILY
COMMUNITY
End: 2024-05-18 | Stop reason: HOSPADM

## 2024-05-17 RX ORDER — BENZONATATE 100 MG/1
100 CAPSULE ORAL 3 TIMES DAILY PRN
COMMUNITY
End: 2024-05-17 | Stop reason: SDUPTHER

## 2024-05-17 RX ORDER — CEFTRIAXONE 2 G/50ML
2 INJECTION, SOLUTION INTRAVENOUS EVERY 24 HOURS
Status: DISCONTINUED | OUTPATIENT
Start: 2024-05-17 | End: 2024-05-18 | Stop reason: HOSPADM

## 2024-05-17 RX ADMIN — ATORVASTATIN CALCIUM 40 MG: 40 TABLET, FILM COATED ORAL at 21:31

## 2024-05-17 RX ADMIN — ENOXAPARIN SODIUM 40 MG: 100 INJECTION SUBCUTANEOUS at 08:17

## 2024-05-17 RX ADMIN — INSULIN LISPRO 2 UNITS: 100 INJECTION, SOLUTION INTRAVENOUS; SUBCUTANEOUS at 11:56

## 2024-05-17 RX ADMIN — LOSARTAN POTASSIUM 25 MG: 25 TABLET, FILM COATED ORAL at 08:17

## 2024-05-17 RX ADMIN — AZITHROMYCIN DIHYDRATE 500 MG: 250 TABLET, FILM COATED ORAL at 21:32

## 2024-05-17 RX ADMIN — AMLODIPINE BESYLATE 10 MG: 10 TABLET ORAL at 08:17

## 2024-05-17 RX ADMIN — CEFTRIAXONE SODIUM 2 G: 2 INJECTION, SOLUTION INTRAVENOUS at 21:32

## 2024-05-17 RX ADMIN — INSULIN LISPRO 1 UNITS: 100 INJECTION, SOLUTION INTRAVENOUS; SUBCUTANEOUS at 18:46

## 2024-05-17 SDOH — SOCIAL STABILITY: SOCIAL INSECURITY: HAS ANYONE EVER THREATENED TO HURT YOUR FAMILY OR YOUR PETS?: NO

## 2024-05-17 SDOH — SOCIAL STABILITY: SOCIAL INSECURITY: DO YOU FEEL UNSAFE GOING BACK TO THE PLACE WHERE YOU ARE LIVING?: NO

## 2024-05-17 SDOH — SOCIAL STABILITY: SOCIAL INSECURITY: DO YOU FEEL ANYONE HAS EXPLOITED OR TAKEN ADVANTAGE OF YOU FINANCIALLY OR OF YOUR PERSONAL PROPERTY?: NO

## 2024-05-17 SDOH — SOCIAL STABILITY: SOCIAL INSECURITY: ABUSE: ADULT

## 2024-05-17 SDOH — SOCIAL STABILITY: SOCIAL INSECURITY: DOES ANYONE TRY TO KEEP YOU FROM HAVING/CONTACTING OTHER FRIENDS OR DOING THINGS OUTSIDE YOUR HOME?: NO

## 2024-05-17 SDOH — SOCIAL STABILITY: SOCIAL INSECURITY: WERE YOU ABLE TO COMPLETE ALL THE BEHAVIORAL HEALTH SCREENINGS?: YES

## 2024-05-17 SDOH — SOCIAL STABILITY: SOCIAL INSECURITY: ARE THERE ANY APPARENT SIGNS OF INJURIES/BEHAVIORS THAT COULD BE RELATED TO ABUSE/NEGLECT?: NO

## 2024-05-17 SDOH — SOCIAL STABILITY: SOCIAL INSECURITY: HAVE YOU HAD ANY THOUGHTS OF HARMING ANYONE ELSE?: NO

## 2024-05-17 SDOH — SOCIAL STABILITY: SOCIAL INSECURITY: ARE YOU OR HAVE YOU BEEN THREATENED OR ABUSED PHYSICALLY, EMOTIONALLY, OR SEXUALLY BY ANYONE?: NO

## 2024-05-17 SDOH — SOCIAL STABILITY: SOCIAL INSECURITY: HAVE YOU HAD THOUGHTS OF HARMING ANYONE ELSE?: NO

## 2024-05-17 ASSESSMENT — ACTIVITIES OF DAILY LIVING (ADL)
ADEQUATE_TO_COMPLETE_ADL: YES
HEARING - LEFT EAR: FUNCTIONAL
HEARING - RIGHT EAR: FUNCTIONAL
GROOMING: INDEPENDENT
JUDGMENT_ADEQUATE_SAFELY_COMPLETE_DAILY_ACTIVITIES: YES
GROOMING: INDEPENDENT
FEEDING YOURSELF: INDEPENDENT
TOILETING: INDEPENDENT
BATHING: INDEPENDENT
PATIENT'S MEMORY ADEQUATE TO SAFELY COMPLETE DAILY ACTIVITIES?: YES
PATIENT'S MEMORY ADEQUATE TO SAFELY COMPLETE DAILY ACTIVITIES?: YES
ADEQUATE_TO_COMPLETE_ADL: YES
DRESSING YOURSELF: INDEPENDENT
HEARING - RIGHT EAR: FUNCTIONAL
JUDGMENT_ADEQUATE_SAFELY_COMPLETE_DAILY_ACTIVITIES: YES
BATHING: INDEPENDENT
WALKS IN HOME: INDEPENDENT
TOILETING: INDEPENDENT
FEEDING YOURSELF: INDEPENDENT
DRESSING YOURSELF: INDEPENDENT
WALKS IN HOME: INDEPENDENT
HEARING - LEFT EAR: FUNCTIONAL

## 2024-05-17 ASSESSMENT — LIFESTYLE VARIABLES
HOW OFTEN DO YOU HAVE 6 OR MORE DRINKS ON ONE OCCASION: NEVER
HOW OFTEN DO YOU HAVE A DRINK CONTAINING ALCOHOL: NEVER
AUDIT-C TOTAL SCORE: 0
AUDIT-C TOTAL SCORE: 0
SKIP TO QUESTIONS 9-10: 1
HOW MANY STANDARD DRINKS CONTAINING ALCOHOL DO YOU HAVE ON A TYPICAL DAY: PATIENT DOES NOT DRINK

## 2024-05-17 ASSESSMENT — COGNITIVE AND FUNCTIONAL STATUS - GENERAL
MOBILITY SCORE: 23
DAILY ACTIVITIY SCORE: 24
DAILY ACTIVITIY SCORE: 24
MOBILITY SCORE: 23
CLIMB 3 TO 5 STEPS WITH RAILING: A LITTLE
CLIMB 3 TO 5 STEPS WITH RAILING: A LITTLE
PATIENT BASELINE BEDBOUND: NO

## 2024-05-17 ASSESSMENT — PAIN SCALES - GENERAL
PAINLEVEL_OUTOF10: 0 - NO PAIN
PAINLEVEL_OUTOF10: 0 - NO PAIN

## 2024-05-17 ASSESSMENT — PATIENT HEALTH QUESTIONNAIRE - PHQ9
2. FEELING DOWN, DEPRESSED OR HOPELESS: NOT AT ALL
1. LITTLE INTEREST OR PLEASURE IN DOING THINGS: NOT AT ALL
SUM OF ALL RESPONSES TO PHQ9 QUESTIONS 1 & 2: 0

## 2024-05-17 ASSESSMENT — PAIN - FUNCTIONAL ASSESSMENT: PAIN_FUNCTIONAL_ASSESSMENT: 0-10

## 2024-05-17 NOTE — H&P
History Of Present Illness  Winston Garvin Jr. is a 59 y.o. male w/ PMHx of NIDDM (A1c 6.2% Oct/2023) c/b retinopathy and neuropathy, HTN, HLD, hydradenitis suppurativa, likely undiagnosed ZACHARIAH (pending test) presenting with malaise, fever, chills SOB and cough, found to be hypoxic with CT concerning for R side PNA.    Patient endorses that he started to feel ill Thursday last week 5/9 with malaise, fatigue and chills and on Saturday he felt sicker with some SOB. On 5/13 he presented to an urgent care clinic and was prescribed ipratropium/albuterol, benzonatate and discharged homed. Patient endorses that over the course of the week, he felt progressively worse, with SOB on exertion, dry cough and CP on deep inspiration in addition to persistent chills. He saw his PCP @ [facility] today, who referred patient for admission, but patient deferred at the time d/t other obligations and presented to the ED after. At the ED patient was found to be hypoxic needing supplemental oxygen. Underwent CT-PE, which was suboptimal quality, but negative for PE and concerning for R reticulonodular opacities.    Patient denies any recent travel or hospitalization. Endorses that one of his children is sick with a barking cough for the past few days. Has been receiving doxycyline for his HS (per dermatology note from [facility], 100mg BID). No other risk factors exposure.      Past Medical History  Past Medical History:   Diagnosis Date    Axillary hidradenitis suppurativa     HLD (hyperlipidemia)     HTN (hypertension)     Spinal stenosis     T2DM (type 2 diabetes mellitus) (Multi)        Surgical History  Past Surgical History:   Procedure Laterality Date    BACK SURGERY  2008    KNEE CARTILAGE SURGERY          Social History  He reports that he has quit smoking. His smoking use included cigarettes. He started smoking about 40 years ago. He has a 28.3 pack-year smoking history. He has never used smokeless tobacco. He reports that he does not  use drugs. No history on file for alcohol use.    Family History  Family History   Problem Relation Name Age of Onset    Breast cancer Mother      Pancreatic cancer Father      Colon cancer Father      Breast cancer Sister          Allergies  Patient has no known allergies.    Review of Systems   Constitutional:  Positive for chills, diaphoresis and fever. Negative for appetite change.   HENT:  Positive for congestion and sore throat. Negative for ear discharge, ear pain, rhinorrhea and sinus pain.    Respiratory:  Positive for cough (Mostly dry, with occasional phlem), chest tightness (On exertion) and shortness of breath. Negative for wheezing.    Cardiovascular:  Negative for chest pain and palpitations.   Gastrointestinal:  Negative for abdominal distention, constipation, diarrhea, nausea and vomiting.   Genitourinary:  Negative for dysuria, frequency and urgency.   Musculoskeletal:  Negative for arthralgias, joint swelling and neck stiffness.   Skin:  Negative for color change.   Neurological:  Negative for dizziness, seizures, syncope, light-headedness, numbness and headaches.        Physical Exam     Last Recorded Vitals  Blood pressure 123/75, pulse 89, temperature 36.7 °C (98.1 °F), temperature source Oral, resp. rate 20, SpO2 96%.    Relevant Results    Scheduled medications  [START ON 5/17/2024] amLODIPine, 10 mg, oral, Daily  atorvastatin, 40 mg, oral, Nightly  [START ON 5/17/2024] azithromycin, 500 mg, intravenous, q24h  cefTRIAXone, 2 g, intravenous, q24h  [START ON 5/17/2024] enoxaparin, 40 mg, subcutaneous, Daily  [START ON 5/17/2024] insulin lispro, 0-5 Units, subcutaneous, TID  [START ON 5/17/2024] losartan, 25 mg, oral, Daily      Continuous medications     PRN medications  PRN medications: acetaminophen, dextrose, dextrose, glucagon, glucagon, guaiFENesin, sennosides    CT angio chest for pulmonary embolism    Result Date: 5/16/2024  Interpreted By:  Patricia Farrell, STUDY: CT ANGIO CHEST FOR  PULMONARY EMBOLISM;  5/16/2024 7:55 pm   INDICATION: Signs/Symptoms:Chest pain, shortness of breath, tachycardic, hypoxic, elevated D-dimer.   COMPARISON: None.   ACCESSION NUMBER(S): [id number]   ORDERING CLINICIAN: SEVEN GUARDADO   TECHNIQUE: Contiguous axial images of the chest were obtained after the intravenous administration of contrast using angiographic PE protocol. Coronal and sagittal reformatted images were reconstructed from the axial data. MIP images were created and reviewed.   FINDINGS: MEDIASTINUM AND LYMPH NODES: No enlarged intrathoracic or axillary lymph nodes.  No pneumomediastinum.   VESSELS:  Normal caliber aorta without evidence of dissection. No significant aortic atherosclerosis. Evaluation of the pulmonary arteries is sub diagnostic due to poor contrast bolus timing (160 Hounsfield units.) Evaluation also partially limited by respiratory motion. The main pulmonary artery is enlarged to 3.5 cm suggestive of pulmonary hypertension.   HEART: Normal in size. Coronary artery calcifications noted however exam is not optimized for evaluation.  No significant pericardial effusion.   LUNG, AIRWAYS, AND PLEURA: Central airways are patent. Scattered reticulonodular opacities noted predominantly in the right upper lobe and right middle lobe. Evaluation of lung parenchyma partially limited by respiratory motion. No consolidation, pulmonary edema, pleural effusion, or pneumothorax.   OSSEOUS STRUCTURES/CHEST WALL: Bilateral nodules in the thyroid measuring up to 2.3 cm on the left and 2.0 cm on the right. Dedicated ultrasound imaging may be obtained on a non-emergent basis if not already performed. No acute osseous abnormality.   UPPER ABDOMEN/OTHER: Diverticulosis without CT evidence of acute diverticulitis.       Sub diagnostic contrast bolus without definite large central filling defect. The larger of the main pulmonary artery suggestive of pulmonary hypertension.   Reticulonodular opacities in the  right chest which may be infectious or inflammatory.   Thyroid nodules measuring up to 2.3 cm, incompletely characterized on current exam. Dedicated ultrasound imaging may be obtained on a non-emergent basis if not already performed.     MACRO: Incidental Finding:  There are few small hypoattenuating nodules measuring equal to or greater than 1.5 cm in the thyroid gland. (**-YCF-**)   Instructions:  Further evaluation with nonemergent thyroid ultrasound. (Managing Incidental Thyroid Nodules Detected on Imaging: White Paper of the ACR Incidental Thyroid Findings Committee. Annie Martínez. et al. Journal of the American College of Radiology,Volume 12, Issue 2, 143 - 150.) [email or web address]   Signed by: Patricia Farrell 5/16/2024 8:14 PM Dictation workstation:   [id number]    Point of Care Ultrasound    Result Date: 5/16/2024  Dejan Arcos DO     5/16/2024  7:57 PM Performed by: Dejan Arcos DO Authorized by: Philippe Bonds MD  Respiratory Indications: shortness of breath Procedure: Cardiac Ultrasound Findings:  Views: parasternal long, apical four and subxiphoid Pericardial effusion: Exam equivocal for small pericardial effusion versus fat pad artifact on parasternal long axis, no large or circumferential effusion noted. Activity: Ventricular contractions were visualized. LV: LV systolic function was NORMAL. RV: RV size was NORMAL. Impression: Cardiac: The focused cardiac ultrasound exam was NORMAL. Procedure: Thoracic Ultrasound Findings: R Lung Sliding: The RIGHT chest was evaluated and LUNG SLIDING was visualized. L Lung Sliding: The LEFT chest was evaluated and LUNG SLIDING was visualized. R Effusion: The RIGHT chest was evaluated and there was NO PLEURAL EFFUSION. L Effusion: The LEFT chest was evaluated and there was NO PLEURAL EFFUSION. A-lines: The RIGHT chest was evaluated and multiple A-LINES were visualized B-lines: The RIGHT chest was evaluated and there were NO B-LINES visualized R  Consolidation: The RIGHT chest was evaluated and there was NO RIGHT CONSOLIDATION. L Consolidation: The LEFT chest was evaluated and there was NO LEFT CONSOLIDATION. Impression: Thorax: The focused thoracic ultrasound exam was NORMAL.     XR chest 2 views    Result Date: 5/16/2024  Interpreted By:  Vj Hook, STUDY: XR CHEST 2 VIEWS;  5/16/2024 5:45 pm   INDICATION: Signs/Symptoms:Shortness of breath.   COMPARISON: None.   ACCESSION NUMBER(S): [id number]   ORDERING CLINICIAN: SEVEN GUARDADO   FINDINGS: PA and lateral radiographs of the chest were provided.   CARDIOMEDIASTINAL SILHOUETTE: Cardiomediastinal silhouette is normal in size and configuration.   LUNGS: Airspace opacity in the left lower lung zone. Right lung is relatively clear. No pleural effusion or pneumothorax.   ABDOMEN: No remarkable upper abdominal findings.   BONES: No acute osseous changes.       1.  Airspace opacity in the left lower lung zone, concerning for pneumonia in the appropriate clinical scenario no although could represent atelectasis.   MACRO: None   Signed by: Vj Hook 5/16/2024 6:13 PM Dictation workstation:   [id number]     Results for orders placed or performed during the hospital encounter of 05/16/24 (from the past 24 hour(s))   Basic metabolic panel   Result Value Ref Range    Glucose 246 (H) 74 - 99 mg/dL    Sodium 132 (L) 136 - 145 mmol/L    Potassium 8.7 (HH) 3.5 - 5.3 mmol/L    Chloride 101 98 - 107 mmol/L    Bicarbonate 20 (L) 21 - 32 mmol/L    Anion Gap 20 10 - 20 mmol/L    Urea Nitrogen 22 6 - 23 mg/dL    Creatinine 0.91 0.50 - 1.30 mg/dL    eGFR >90 >60 mL/min/1.73m*2    Calcium 8.8 8.6 - 10.6 mg/dL   CBC and Auto Differential   Result Value Ref Range    WBC 11.3 4.4 - 11.3 x10*3/uL    nRBC 0.0 0.0 - 0.0 /100 WBCs    RBC 4.21 (L) 4.50 - 5.90 x10*6/uL    Hemoglobin 11.3 (L) 13.5 - 17.5 g/dL    Hematocrit 35.5 (L) 41.0 - 52.0 %    MCV 84 80 - 100 fL    MCH 26.8 26.0 - 34.0 pg    MCHC 31.8 (L) 32.0 - 36.0  g/dL    RDW 13.3 11.5 - 14.5 %    Platelets 186 150 - 450 x10*3/uL    Neutrophils % 93.3 40.0 - 80.0 %    Immature Granulocytes %, Automated 0.3 0.0 - 0.9 %    Lymphocytes % 5.6 13.0 - 44.0 %    Monocytes % 0.6 2.0 - 10.0 %    Eosinophils % 0.0 0.0 - 6.0 %    Basophils % 0.2 0.0 - 2.0 %    Neutrophils Absolute 10.53 (H) 1.20 - 7.70 x10*3/uL    Immature Granulocytes Absolute, Automated 0.03 0.00 - 0.70 x10*3/uL    Lymphocytes Absolute 0.63 (L) 1.20 - 4.80 x10*3/uL    Monocytes Absolute 0.07 (L) 0.10 - 1.00 x10*3/uL    Eosinophils Absolute 0.00 0.00 - 0.70 x10*3/uL    Basophils Absolute 0.02 0.00 - 0.10 x10*3/uL   Troponin I, High Sensitivity   Result Value Ref Range    Troponin I, High Sensitivity 5 0 - 53 ng/L   B-type natriuretic peptide   Result Value Ref Range    BNP 4 0 - 99 pg/mL   Magnesium   Result Value Ref Range    Magnesium 2.83 (H) 1.60 - 2.40 mg/dL   D-dimer, VTE Exclusion   Result Value Ref Range    D-Dimer, Quantitative VTE Exclusion 1,202 (H) <=500 ng/mL FEU   Basic metabolic panel   Result Value Ref Range    Glucose 260 (H) 74 - 99 mg/dL    Sodium 137 136 - 145 mmol/L    Potassium 4.8 3.5 - 5.3 mmol/L    Chloride 103 98 - 107 mmol/L    Bicarbonate 21 21 - 32 mmol/L    Anion Gap 18 10 - 20 mmol/L    Urea Nitrogen 22 6 - 23 mg/dL    Creatinine 1.09 0.50 - 1.30 mg/dL    eGFR 77 >60 mL/min/1.73m*2    Calcium 8.9 8.6 - 10.6 mg/dL        Assessment/Plan   Principal Problem:    Pneumonia of right lower lobe due to infectious organism      Winston Garvin JrMinerva is a 59 y.o. male w/ PMHx of NIDDM (A1c 6.2% Oct/2023) c/b retinopathy and neuropathy, HTN, HLD, hydradenitis suppurativa, likely undiagnosed ZACHARIAH (pending test) presenting with malaise, fever, chills SOB and cough, found to be hypoxic with CT concerning for R side PNA    Although PNA is high on the differential, per the CT-PE it's not significant enough to the degree of hypoxia the patient is presenting. I suspect his SOB and hypoxia are occurring on a  substrate of possible pulm HTN, COPD given significant smoking hx, likely ZACHARIAH that is untreated and potentially HFpEF leading to pulm HTN (which was concerning per CT scan). Patient would benefit from further work-up for those things as outpatient after discharge from this acute episode.    #PNA  :: unclear which bug so far, with viral being high on the differential  :: negative CT-PE, but reticulations on the R lung  :: no recent hospitalization but recent doxycycline use, which would make strep pneumonia, mycoplasma, legionella and chlamydophila less likely   - MRSA swab + legionella and strep  - sputum culture  - procal for further ABx de-escalation  - will switch doxy to azithromycin for now  - start ceftriaxone   - respiratory panel    #Acute hypoxic respiratory failure  :: likely multifactorial in etiology, but aggravated by current PNA episode  - supplemental oxygen for SpO2 > 94%  - patient would benefit from outpatient TTE, sleep study and spirometry    #HTN  #DLD  :: on home amlodipine and losartan. On atorva 40mg  - c/w home meds    #T2DM (A1c 6.2 Oct/2023)  :: c/b neuropathy and retinopathy  :: on home glipizide 2.5mg daily and liraglutide  - hold home glipizide and liraglutide  - will start with SSI given well controlled BS. Will plan to convert to long acting pending usage    #HS  :: not active flair, but recent treatment @ [facility]  - will hold on doxy since patient receiving azithro      F: PRN  E: PRN  N: Diabetic  A: PIV  DVT: Enoxaparin  GI: None  NOK: Eli (wife) [phone]  Code: Full Code (confirmed on admission)           Brown Davila MD

## 2024-05-17 NOTE — PROGRESS NOTES
"Winston Garvin Jr. is a 59 y.o. male on hospital day 1 of admission presenting with Pneumonia of right lower lobe due to infectious organism.    Subjective     The patient was seen and examined at bedside in the ED. Denies SOB a this time. Saturating well on RA.    Objective     GENERAL APPEARANCE: A&Ox3, appears in no acute distress  HEAD: normocephalic, atraumatic  THROAT: Oral cavity and pharynx normal. No inflammation, swelling, exudate, or lesions.  NECK: Neck supple, non-tender without lymphadenopathy, masses or thyromegaly.  CARDIAC: Normal S1 and S2. No S3, S4 or murmurs. Rhythm is regular. There is no peripheral edema, cyanosis or pallor. Extremities are warm and well perfused. No carotid bruits.  LUNGS: Clear to auscultation bilaterally without rales, rhonchi, wheezing or diminished breath sounds.  ABDOMEN: Positive bowel sounds. Soft, nondistended, nontender. No guarding or rebound. No masses.  EXTREMITIES: No significant deformity or joint abnormality. No edema. Peripheral pulses intact. No varicosities.  SKIN: Skin normal color, texture and turgor with no lesions or rash  PSYCHIATRIC: oriented to person, place, and time, good judgement and reason, without hallucinations, abnormal affect or abnormal behaviors during the examination. Patient is not suicidal.     Last Recorded Vitals  Blood pressure 147/73, pulse 81, temperature 36.4 °C (97.5 °F), resp. rate 18, height 1.675 m (5' 5.95\"), weight 98.9 kg (218 lb), SpO2 94%.  Intake/Output last 3 Shifts:  I/O last 3 completed shifts:  In: 300 [IV Piggyback:300]  Out: -     Relevant Results  Lab Results   Component Value Date    WBC 8.8 05/17/2024    HGB 10.7 (L) 05/17/2024    HCT 32.7 (L) 05/17/2024    MCV 83 05/17/2024     05/17/2024      Lab Results   Component Value Date    GLUCOSE 280 (H) 05/17/2024    CALCIUM 8.4 (L) 05/17/2024     05/17/2024    K 4.5 05/17/2024    CO2 23 05/17/2024     05/17/2024    BUN 23 05/17/2024    CREATININE 1.20 " 05/17/2024     Scheduled medications  amLODIPine, 10 mg, oral, Daily  atorvastatin, 40 mg, oral, Nightly  azithromycin, 500 mg, oral, q24h BASILIA  cefTRIAXone, 2 g, intravenous, q24h  enoxaparin, 40 mg, subcutaneous, Daily  insulin lispro, 0-5 Units, subcutaneous, TID  losartan, 25 mg, oral, Daily      Continuous medications     PRN medications  PRN medications: acetaminophen, dextrose, dextrose, glucagon, glucagon, guaiFENesin, sennosides    Assessment/Plan     Principal Problem:    Pneumonia of right lower lobe due to infectious organism        Winston Garvin Jr. is a 59 y.o. male w/ PMHx of NIDDM (A1c 6.2% Oct/2023) c/b retinopathy and neuropathy, HTN, HLD, hydradenitis suppurativa, likely undiagnosed ZACHARIAH (pending test) presenting with malaise, fever, chills SOB and cough, found to be hypoxic with CT concerning for R side PNA     Although PNA is high on the differential, per the CT-PE it's not significant enough to the degree of hypoxia the patient is presenting. I suspect his SOB and hypoxia are occurring on a substrate of possible pulm HTN, COPD given significant smoking hx, likely ZACHARIAH that is untreated and potentially HFpEF leading to pulm HTN (which was concerning per CT scan). Patient would benefit from further work-up for those things as outpatient after discharge from this acute episode.     CAP vs. Viral PNA  - unclear etiology, XR pattern may suggest viral  - negative CT-PE, but reticulations on the R lung  - no recent hospitalization but recent doxycycline use, which would make strep pneumonia, mycoplasma, legionella and chlamydophila less likely   - MRSA swab + legionella and strep  - sputum culture  - procal for further ABx de-escalation  - respiratory panel  - Cont. Ceftriaxone and azithromycin   - Patient currently saturating well on RA     Acute hypoxic respiratory failure  - likely multifactorial in etiology, but aggravated by current PNA episode  - supplemental oxygen for SpO2 > 94%, currently on  RA  - patient would benefit from outpatient TTE, sleep study and spirometry     HTN  DLD  - on home amlodipine and losartan. On atorva 40mg  - c/w home meds     T2DM (A1c 6.2 Oct/2023)  - c/b neuropathy and retinopathy  - on home glipizide 2.5mg daily and liraglutide  - hold home glipizide and liraglutide  - will start with SSI given well controlled BS. Will plan to convert to long acting pending usage     HS  - not active flair, but recent treatment @ [facility]  - will hold on doxy since patient receiving azithro        N: Diabetic  A: PIV  DVT: Enoxaparin  GI: None  NOK: Eli (wife) [phone]  Code: Full Code (confirmed on admission)  Dispo: SOB improved, saturating well on RA, discharge tomorrow on PO abx if clinical course continues     Neo Rivera MD     The patient encounter includes all but not limited to; Evaluation of laboratory results, pertinent imaging, and vital signs. Daily updates are discussed with any consulting services and family/medical power of  as needed. The patient's discharge  process begins at admission and daily contact with the patient's TCC and SW is pertinent in their efficient and safe discharge.

## 2024-05-17 NOTE — PROGRESS NOTES
Pharmacy Medication History Review    Winston Garvin Jr. is a 59 y.o. male admitted for Pneumonia of right lower lobe due to infectious organism. Pharmacy reviewed the patient's [id number] medications and allergies for accuracy.    The list below reflects the updated PTA list. Comments regarding how patient may be taking medications differently can be found in the Admit Orders Activity  Prior to Admission Medications   Prescriptions Last Dose Informant   acetaminophen (Tylenol) 325 mg tablet  Self   Sig: Take 3 tablets (975 mg) by mouth every 8 hours if needed for mild pain (1 - 3).   amLODIPine (Norvasc) 10 mg tablet  Self   Sig: Take 1 tablet (10 mg) by mouth once daily.   atorvastatin (Lipitor) 40 mg tablet  Self   Sig: Take 1 tablet (40 mg) by mouth once daily.   benzonatate (Tessalon) 100 mg capsule  Self   Sig: Take 1 capsule (100 mg) by mouth 3 times a day as needed for cough.   cyclobenzaprine (Flexeril) 10 mg tablet  Self   Sig: Take 1 tablet (10 mg) by mouth 3 times a day as needed for muscle spasms.   diclofenac sodium 1 % kit  Self   Sig: Apply 4 g topically 4 times a day as needed (back pain).   doxycycline (Monodox) 100 mg capsule  Self   Sig: Take 1 capsule (100 mg) by mouth 2 times a day.   glipiZIDE (Glucotrol) 5 mg tablet  Self   Sig: Take 1 tablet (5 mg) by mouth 2 times a day before meals.   guaiFENesin (Mucinex) 600 mg 12 hr tablet  Self   Sig: Take 1 tablet (600 mg) by mouth 2 times a day as needed for cough or congestion.   ibuprofen 200 mg tablet  Self   Sig: Take 2 tablets (400 mg) by mouth every 6 hours if needed for moderate pain (4 - 6).   liraglutide (Victoza 2-Esteban) 0.6 mg/0.1 mL (18 mg/3 mL) injection  Self   Sig: Inject 0.3 mL (1.8 mg) under the skin once daily.   losartan (Cozaar) 25 mg tablet  Self   Sig: Take 1 tablet (25 mg) by mouth once daily.   magnesium oxide (Mag-Ox) 400 mg (241.3 mg magnesium) tablet  Self   Sig: Take 1 tablet (400 mg) by mouth once daily.   meloxicam  (Mobic) 15 mg tablet  Self   Sig: Take 1 tablet (15 mg) by mouth once daily as needed for moderate pain (4 - 6).   sildenafil (Viagra) 100 mg tablet  Self   Sig: Take 1 tablet (100 mg) by mouth once daily as needed for erectile dysfunction.      Facility-Administered Medications: None       The list below reflects the updated allergy list. Please review each documented allergy for additional clarification and justification.  Allergies  Reviewed by Brown Davila MD on 5/16/2024   No Known Allergies         Patient accepts M2B at discharge. Pharmacy has been updated to [facility].    Sources used to complete the med history include out patient fill history, OARRS, and patient interview   Reliable historian    Below are additional concerns with the patient's PTA list.  N/A    Andrez Scott PharmD  Transitions of Care Pharmacist  [facility]  Please reach out via Secure Chat for questions, or if no response call [employer] or [facility]

## 2024-05-18 VITALS
TEMPERATURE: 97.2 F | SYSTOLIC BLOOD PRESSURE: 146 MMHG | BODY MASS INDEX: 35.03 KG/M2 | HEART RATE: 90 BPM | WEIGHT: 218 LBS | DIASTOLIC BLOOD PRESSURE: 80 MMHG | HEIGHT: 66 IN | OXYGEN SATURATION: 92 % | RESPIRATION RATE: 18 BRPM

## 2024-05-18 LAB
ADENOVIRUS RVP, VIRC: NOT DETECTED
ALBUMIN SERPL BCP-MCNC: 3.3 G/DL (ref 3.4–5)
ANION GAP SERPL CALC-SCNC: 15 MMOL/L (ref 10–20)
BUN SERPL-MCNC: 20 MG/DL (ref 6–23)
CALCIUM SERPL-MCNC: 8.6 MG/DL (ref 8.6–10.6)
CHLORIDE SERPL-SCNC: 107 MMOL/L (ref 98–107)
CO2 SERPL-SCNC: 22 MMOL/L (ref 21–32)
CREAT SERPL-MCNC: 0.94 MG/DL (ref 0.5–1.3)
EGFRCR SERPLBLD CKD-EPI 2021: >90 ML/MIN/1.73M*2
ENTEROVIRUS/RHINOVIRUS RVP, VIRC: NOT DETECTED
ERYTHROCYTE [DISTWIDTH] IN BLOOD BY AUTOMATED COUNT: 13.5 % (ref 11.5–14.5)
GLUCOSE BLD MANUAL STRIP-MCNC: 173 MG/DL (ref 74–99)
GLUCOSE SERPL-MCNC: 178 MG/DL (ref 74–99)
HCT VFR BLD AUTO: 36 % (ref 41–52)
HGB BLD-MCNC: 10.5 G/DL (ref 13.5–17.5)
HUMAN BOCAVIRUS RVP, VIRC: NOT DETECTED
HUMAN CORONAVIRUS RVP, VIRC: NOT DETECTED
INFLUENZA A , VIRC: NOT DETECTED
INFLUENZA A H1N1-09 , VIRC: NOT DETECTED
INFLUENZA B PCR, VIRC: NOT DETECTED
MCH RBC QN AUTO: 26.6 PG (ref 26–34)
MCHC RBC AUTO-ENTMCNC: 29.2 G/DL (ref 32–36)
MCV RBC AUTO: 91 FL (ref 80–100)
METAPNEUMOVIRUS , VIRC: NOT DETECTED
NRBC BLD-RTO: 0 /100 WBCS (ref 0–0)
PARAINFLUENZA PCR, VIRC: NOT DETECTED
PHOSPHATE SERPL-MCNC: 3.6 MG/DL (ref 2.5–4.9)
PLATELET # BLD AUTO: 200 X10*3/UL (ref 150–450)
POTASSIUM SERPL-SCNC: 3.9 MMOL/L (ref 3.5–5.3)
RBC # BLD AUTO: 3.95 X10*6/UL (ref 4.5–5.9)
RSV PCR, RVP, VIRC: NOT DETECTED
SODIUM SERPL-SCNC: 140 MMOL/L (ref 136–145)
STAPHYLOCOCCUS SPEC CULT: NORMAL
WBC # BLD AUTO: 7.4 X10*3/UL (ref 4.4–11.3)

## 2024-05-18 PROCEDURE — 36415 COLL VENOUS BLD VENIPUNCTURE: CPT | Performed by: STUDENT IN AN ORGANIZED HEALTH CARE EDUCATION/TRAINING PROGRAM

## 2024-05-18 PROCEDURE — 80069 RENAL FUNCTION PANEL: CPT | Performed by: STUDENT IN AN ORGANIZED HEALTH CARE EDUCATION/TRAINING PROGRAM

## 2024-05-18 PROCEDURE — 2500000002 HC RX 250 W HCPCS SELF ADMINISTERED DRUGS (ALT 637 FOR MEDICARE OP, ALT 636 FOR OP/ED): Performed by: STUDENT IN AN ORGANIZED HEALTH CARE EDUCATION/TRAINING PROGRAM

## 2024-05-18 PROCEDURE — 82947 ASSAY GLUCOSE BLOOD QUANT: CPT

## 2024-05-18 PROCEDURE — 2500000001 HC RX 250 WO HCPCS SELF ADMINISTERED DRUGS (ALT 637 FOR MEDICARE OP): Performed by: STUDENT IN AN ORGANIZED HEALTH CARE EDUCATION/TRAINING PROGRAM

## 2024-05-18 PROCEDURE — 85027 COMPLETE CBC AUTOMATED: CPT | Performed by: STUDENT IN AN ORGANIZED HEALTH CARE EDUCATION/TRAINING PROGRAM

## 2024-05-18 PROCEDURE — 2500000004 HC RX 250 GENERAL PHARMACY W/ HCPCS (ALT 636 FOR OP/ED): Performed by: STUDENT IN AN ORGANIZED HEALTH CARE EDUCATION/TRAINING PROGRAM

## 2024-05-18 PROCEDURE — 99239 HOSP IP/OBS DSCHRG MGMT >30: CPT | Performed by: STUDENT IN AN ORGANIZED HEALTH CARE EDUCATION/TRAINING PROGRAM

## 2024-05-18 RX ORDER — ALBUTEROL SULFATE 90 UG/1
2 AEROSOL, METERED RESPIRATORY (INHALATION) EVERY 4 HOURS PRN
Qty: 6.7 G | Refills: 0 | Status: SHIPPED | OUTPATIENT
Start: 2024-05-18

## 2024-05-18 RX ORDER — LEVOFLOXACIN 750 MG/1
750 TABLET ORAL DAILY
Qty: 7 TABLET | Refills: 0 | Status: SHIPPED | OUTPATIENT
Start: 2024-05-18 | End: 2024-05-25

## 2024-05-18 RX ORDER — GUAIFENESIN 600 MG/1
1200 TABLET, EXTENDED RELEASE ORAL 2 TIMES DAILY
Qty: 28 TABLET | Refills: 0 | Status: SHIPPED | OUTPATIENT
Start: 2024-05-18 | End: 2024-05-25

## 2024-05-18 RX ADMIN — INSULIN LISPRO 1 UNITS: 100 INJECTION, SOLUTION INTRAVENOUS; SUBCUTANEOUS at 09:21

## 2024-05-18 RX ADMIN — AMLODIPINE BESYLATE 10 MG: 10 TABLET ORAL at 09:18

## 2024-05-18 RX ADMIN — LOSARTAN POTASSIUM 25 MG: 25 TABLET, FILM COATED ORAL at 09:18

## 2024-05-18 RX ADMIN — ENOXAPARIN SODIUM 40 MG: 100 INJECTION SUBCUTANEOUS at 09:18

## 2024-05-18 NOTE — CARE PLAN
The patient's goals for the shift include      The clinical goals for the shift include patient will remain hemodynamically stable throughout shift    Over the shift, the patient did not make progress toward the following goals. Barriers to progression include . Recommendations to address these barriers include   Problem: Safety  Goal: Patient will be injury free during hospitalization  Outcome: Progressing     Problem: Safety  Goal: I will remain free of falls  Outcome: Progressing     Problem: Daily Care  Goal: Daily care needs are met  Outcome: Progressing     Problem: Psychosocial Needs  Goal: Demonstrates ability to cope with hospitalization/illness  Outcome: Progressing     Problem: Psychosocial Needs  Goal: Collaborate with me, my family, and caregiver to identify my specific goals  Outcome: Progressing   .

## 2024-05-18 NOTE — DISCHARGE SUMMARY
Discharge Diagnosis  Pneumonia of right lower lobe due to infectious organism    Issues Requiring Follow-Up  COPD    Test Results Pending At Discharge  N/A    Hospital Course    Winston Garvin Jr. is a 59 y.o. male w/ PMHx of NIDDM (A1c 6.2% Oct/2023) c/b retinopathy and neuropathy, HTN, HLD, hydradenitis suppurativa, likely undiagnosed ZACHARIAH (pending test) presenting with malaise, fever, chills SOB and cough, found to be hypoxic with CT concerning for R side PNA. Although PNA is high on the differential, per the CT-PE it's not significant enough to the degree of hypoxia and likely has a component of COPD given significant smoking hx, Patient would benefit from further work-up for those things as outpatient after discharge from this acute episode with Pulmonology. The patient was started on Ceftriaxone and azithromycin. Strep antigen positive through urine. Patient only required supplemental O2 for a short period on arrival, despite chart stating he was on supplemental O2. On the day of discharge, the patient denied SOB while walking to the bathroom, and saturation was at 94%. We will discharge the patient home to complete course of antibiotics on Levofloxacin. We will also send a short course of Mucinex, as well as a rescue inhaler. The patient was discharged in stable condition. Prescriptions were sent to his home pharmacy, and a referral was placed for a pulmonology follow-up.       Pertinent Physical Exam At Time of Discharge    GENERAL APPEARANCE: A&Ox3, appears in no acute distress  HEAD: normocephalic, atraumatic  THROAT: Oral cavity and pharynx normal. No inflammation, swelling, exudate, or lesions.  NECK: Neck supple, non-tender without lymphadenopathy, masses or thyromegaly.  CARDIAC: Normal S1 and S2. No S3, S4 or murmurs. Rhythm is regular. There is no peripheral edema, cyanosis or pallor. Extremities are warm and well perfused. No carotid bruits.  LUNGS: Clear to auscultation bilaterally without rales,  rhonchi, wheezing or diminished breath sounds.  ABDOMEN: Positive bowel sounds. Soft, nondistended, nontender. No guarding or rebound. No masses.  EXTREMITIES: No significant deformity or joint abnormality. No edema. Peripheral pulses intact. No varicosities.  SKIN: Skin normal color, texture and turgor with no lesions or rash  PSYCHIATRIC: oriented to person, place, and time, good judgement and reason, without hallucinations, abnormal affect or abnormal behaviors during the examination. Patient is not suicidal.        Home Medications     Medication List      START taking these medications     albuterol 90 mcg/actuation inhaler; Commonly known as: Proventil HFA;   Inhale 2 puffs every 4 hours if needed for wheezing or shortness of   breath.   levoFLOXacin 750 mg tablet; Commonly known as: Levaquin; Take 1 tablet   (750 mg) by mouth once daily for 7 days.     CHANGE how you take these medications     guaiFENesin 600 mg 12 hr tablet; Commonly known as: Mucinex; Take 2   tablets (1,200 mg) by mouth 2 times a day for 7 days.; What changed: how   much to take, when to take this, reasons to take this     CONTINUE taking these medications     acetaminophen 325 mg tablet; Commonly known as: Tylenol   amLODIPine 10 mg tablet; Commonly known as: Norvasc   atorvastatin 40 mg tablet; Commonly known as: Lipitor   benzonatate 100 mg capsule; Commonly known as: Tessalon   cyclobenzaprine 10 mg tablet; Commonly known as: Flexeril   diclofenac sodium 1 % kit   glipiZIDE 5 mg tablet; Commonly known as: Glucotrol   ibuprofen 200 mg tablet   losartan 25 mg tablet; Commonly known as: Cozaar   magnesium oxide 400 mg (241.3 mg magnesium) tablet; Commonly known as:   Mag-Ox   meloxicam 15 mg tablet; Commonly known as: Mobic   sildenafil 100 mg tablet; Commonly known as: Viagra   Victoza 2-Esteban 0.6 mg/0.1 mL (18 mg/3 mL) injection; Generic drug:   liraglutide     STOP taking these medications     doxycycline 100 mg capsule; Commonly known  as: Monodox       Outpatient Follow-Up  Future Appointments   Date Time Provider Department Center   7/23/2024 10:20 AM Monica Eagle PA-C [id number]       Lupillo Rivera MD

## 2024-05-20 NOTE — SIGNIFICANT EVENT
COPD Follow Up Call    Patient Reports Feelings (Symptoms) are: still a little short of breath    The Patient discharged With the following Diagnosis: COPD    If You were Referred to Pulmonary Rehab, Have You Followed up With them?    How is your breathing? Still a little short of breath    How is your activity? good    How is your cough? none    Mucus: none    How often Are you using a Quick Relief/ Rescue Inhaler / Nebulizer:    as scheduled- educated on taking and when exceeds guidelines, to call doctor.

## 2024-06-20 ENCOUNTER — OFFICE VISIT (OUTPATIENT)
Dept: PULMONOLOGY | Facility: HOSPITAL | Age: 59
End: 2024-06-20
Payer: COMMERCIAL

## 2024-06-20 VITALS
DIASTOLIC BLOOD PRESSURE: 81 MMHG | WEIGHT: 226 LBS | OXYGEN SATURATION: 94 % | TEMPERATURE: 97.3 F | HEART RATE: 82 BPM | BODY MASS INDEX: 36.54 KG/M2 | SYSTOLIC BLOOD PRESSURE: 135 MMHG

## 2024-06-20 DIAGNOSIS — J18.9 PNEUMONIA OF RIGHT LOWER LOBE DUE TO INFECTIOUS ORGANISM: Primary | ICD-10-CM

## 2024-06-20 DIAGNOSIS — R29.818 SUSPECTED SLEEP APNEA: ICD-10-CM

## 2024-06-20 DIAGNOSIS — Z87.891 FORMER CIGARETTE SMOKER: ICD-10-CM

## 2024-06-20 PROCEDURE — 99214 OFFICE O/P EST MOD 30 MIN: CPT | Performed by: NURSE PRACTITIONER

## 2024-06-20 PROCEDURE — 3079F DIAST BP 80-89 MM HG: CPT | Performed by: NURSE PRACTITIONER

## 2024-06-20 PROCEDURE — 4010F ACE/ARB THERAPY RXD/TAKEN: CPT | Performed by: NURSE PRACTITIONER

## 2024-06-20 PROCEDURE — 99204 OFFICE O/P NEW MOD 45 MIN: CPT | Performed by: NURSE PRACTITIONER

## 2024-06-20 PROCEDURE — 1036F TOBACCO NON-USER: CPT | Performed by: NURSE PRACTITIONER

## 2024-06-20 PROCEDURE — 3075F SYST BP GE 130 - 139MM HG: CPT | Performed by: NURSE PRACTITIONER

## 2024-06-20 RX ORDER — PEN NEEDLE, DIABETIC 31 GX3/16"
NEEDLE, DISPOSABLE MISCELLANEOUS
COMMUNITY
Start: 2024-06-02

## 2024-06-20 RX ORDER — DOXYCYCLINE 100 MG/1
1 CAPSULE ORAL
COMMUNITY
Start: 2024-06-02

## 2024-06-20 RX ORDER — AZITHROMYCIN 250 MG/1
TABLET, FILM COATED ORAL
COMMUNITY
Start: 2024-05-16 | End: 2024-06-20 | Stop reason: WASHOUT

## 2024-06-20 RX ORDER — GABAPENTIN 600 MG/1
600 TABLET ORAL 2 TIMES DAILY
COMMUNITY

## 2024-06-20 RX ORDER — DICLOFENAC SODIUM 10 MG/G
GEL TOPICAL
COMMUNITY
Start: 2024-06-19

## 2024-06-20 RX ORDER — DEXTROSE 4 G
TABLET,CHEWABLE ORAL
COMMUNITY

## 2024-06-20 RX ORDER — ACETAMINOPHEN 500 MG
TABLET ORAL EVERY 6 HOURS PRN
COMMUNITY

## 2024-06-20 SDOH — ECONOMIC STABILITY: FOOD INSECURITY: WITHIN THE PAST 12 MONTHS, THE FOOD YOU BOUGHT JUST DIDN'T LAST AND YOU DIDN'T HAVE MONEY TO GET MORE.: NEVER TRUE

## 2024-06-20 SDOH — ECONOMIC STABILITY: FOOD INSECURITY: WITHIN THE PAST 12 MONTHS, YOU WORRIED THAT YOUR FOOD WOULD RUN OUT BEFORE YOU GOT MONEY TO BUY MORE.: NEVER TRUE

## 2024-06-20 ASSESSMENT — ENCOUNTER SYMPTOMS
SLEEP DISTURBANCE: 0
AGITATION: 0
VOMITING: 0
TREMORS: 0
EYE ITCHING: 0
DIARRHEA: 0
CHEST TIGHTNESS: 0
EYE REDNESS: 0
HEADACHES: 0
WEAKNESS: 0
APPETITE CHANGE: 0
CHILLS: 0
MYALGIAS: 0
ABDOMINAL PAIN: 0
FEVER: 0
SINUS PRESSURE: 0
ARTHRALGIAS: 0
VOICE CHANGE: 0
BACK PAIN: 0
COUGH: 0
NERVOUS/ANXIOUS: 0
FATIGUE: 0
BRUISES/BLEEDS EASILY: 0
PALPITATIONS: 0
SINUS PAIN: 0
WHEEZING: 0
UNEXPECTED WEIGHT CHANGE: 0
ROS GI COMMENTS: DENIES ACID REFLUX
JOINT SWELLING: 0
SORE THROAT: 0
SHORTNESS OF BREATH: 0
NAUSEA: 0
TROUBLE SWALLOWING: 0
STRIDOR: 0
RHINORRHEA: 0
ACTIVITY CHANGE: 0
DIZZINESS: 0

## 2024-06-20 ASSESSMENT — LIFESTYLE VARIABLES
SKIP TO QUESTIONS 9-10: 1
HOW OFTEN DO YOU HAVE A DRINK CONTAINING ALCOHOL: NEVER
HOW OFTEN DO YOU HAVE SIX OR MORE DRINKS ON ONE OCCASION: NEVER
HOW MANY STANDARD DRINKS CONTAINING ALCOHOL DO YOU HAVE ON A TYPICAL DAY: PATIENT DOES NOT DRINK
AUDIT-C TOTAL SCORE: 0

## 2024-06-20 ASSESSMENT — PAIN SCALES - GENERAL: PAINLEVEL: 8

## 2024-06-20 ASSESSMENT — PATIENT HEALTH QUESTIONNAIRE - PHQ9
2. FEELING DOWN, DEPRESSED OR HOPELESS: NOT AT ALL
SUM OF ALL RESPONSES TO PHQ9 QUESTIONS 1 & 2: 0
1. LITTLE INTEREST OR PLEASURE IN DOING THINGS: NOT AT ALL

## 2024-06-20 NOTE — PROGRESS NOTES
Patient: Winston Garvin Jr.    [id number]  : 1965 -- AGE 59 y.o.    Provider: KINGA Lao     Location [facility]   Service Date: 2024       Department of Medicine  Division of Pulmonary, Critical Care, and Sleep Medicine       [facility]  New Visit Note    HISTORY OF PRESENT ILLNESS     The patient's referring provider is: Lupillo Rivera MD    PCP: Dr. Willa Chan    HISTORY OF PRESENT ILLNESS   Winston Garvin Jr. is a 59 y.o. male who presents to a [facility] for an evaluation with concerns of pneumonia. I have independently interviewed and examined the patient in the office and reviewed available records.    Current History  Patient presents to pulmonary clinic today after recent hospitalization from 2024 to 2024 for pneumonia and hypoxic respiratory failure.  He initially presented with malaise, fever, chills, shortness of breath and cough and was found to be hypoxic with CTA chest on 2024 concerning for a right-sided pneumonia.  Personally reviewed these images.  There is no PFT on record.  Patient was treated with ceftriaxone and azithromycin and only required supplemental oxygen for short period of time.  Was discharged home stable and on room air and was sent home to finish a course of Levaquin. Per chart review of PCP note from 24; patient had improved post discharged and was feeling much better.    On today's visit, the patient reports he is feeling much better since being discharged. No SOB at rest and no VERMA. No chronic cough. Denies wheezing. Denies orthopnea. Intermittent mild lower leg swelling. No CP, palpitations. No current fever, sweats, chills. Weight has been stable. Has an albuterol HFA that he used a couple times while recovering from PNA but no need for it since. No GERD. Denies rhinitis/sinus issues. He takes Doxycyline BID chronically for hx  of hidradenitis.     Denies premature birth. No childhood pulmonary issues growing up. No other pulmonary hospitalizations aside from the one previously described. Has never been on home oxygen therapy before.    Has never completed a sleep study before. States his PCP ordered him a sleep study; not yet scheduled. He does snore. Not waking up choking. Intermittent daytime fatigue. Can nod off intermittently.     CAT Today:  ACT Today:  ESS Today:     Prior Notes & History   Hospitalization 5/17/24 - 5/18/24 for pneumonia and hypoxic respiratory failure    Hospital Course  Winston Garvin Jr. is a 59 y.o. male w/ PMHx of NIDDM (A1c 6.2% Oct/2023) c/b retinopathy and neuropathy, HTN, HLD, hydradenitis suppurativa, likely undiagnosed ZACHARIAH (pending test) presenting with malaise, fever, chills SOB and cough, found to be hypoxic with CT concerning for R side PNA. Although PNA is high on the differential, per the CT-PE it's not significant enough to the degree of hypoxia and likely has a component of COPD given significant smoking hx, Patient would benefit from further work-up for those things as outpatient after discharge from this acute episode with Pulmonology. The patient was started on Ceftriaxone and azithromycin. Strep antigen positive through urine. Patient only required supplemental O2 for a short period on arrival, despite chart stating he was on supplemental O2. On the day of discharge, the patient denied SOB while walking to the bathroom, and saturation was at 94%. We will discharge the patient home to complete course of antibiotics on Levofloxacin. We will also send a short course of Mucinex, as well as a rescue inhaler. The patient was discharged in stable condition. Prescriptions were sent to his home pharmacy, and a referral was placed for a pulmonology follow-up.    REVIEW OF SYSTEMS     REVIEW OF SYSTEMS  Review of Systems   Constitutional:  Negative for activity change, appetite change, chills, fatigue,  fever and unexpected weight change.   HENT:  Negative for congestion, postnasal drip, rhinorrhea, sinus pressure, sinus pain, sneezing, sore throat, trouble swallowing and voice change.         Denies throat clearing   Eyes:  Negative for redness and itching.   Respiratory:  Negative for cough, chest tightness, shortness of breath, wheezing and stridor.    Cardiovascular:  Negative for chest pain, palpitations and leg swelling.        Denies orthopnea   Gastrointestinal:  Negative for abdominal pain, diarrhea, nausea and vomiting.        Denies acid reflux   Musculoskeletal:  Negative for arthralgias, back pain, joint swelling and myalgias.   Skin:  Negative for rash.   Allergic/Immunologic: Negative for immunocompromised state.   Neurological:  Negative for dizziness, tremors, weakness and headaches.   Hematological:  Does not bruise/bleed easily.   Psychiatric/Behavioral:  Negative for agitation and sleep disturbance. The patient is not nervous/anxious.         Denies depression   All other systems reviewed and are negative.      ALLERGIES AND MEDICATIONS     ALLERGIES  No Known Allergies    MEDICATIONS  Current Outpatient Medications   Medication Sig Dispense Refill    acetaminophen (Tylenol) 325 mg tablet Take 3 tablets (975 mg) by mouth every 8 hours if needed for mild pain (1 - 3).      albuterol (Proventil HFA) 90 mcg/actuation inhaler Inhale 2 puffs every 4 hours if needed for wheezing or shortness of breath. 6.7 g 0    amLODIPine (Norvasc) 10 mg tablet Take 1 tablet (10 mg) by mouth once daily.      atorvastatin (Lipitor) 40 mg tablet Take 1 tablet (40 mg) by mouth once daily.      cyclobenzaprine (Flexeril) 10 mg tablet Take 1 tablet (10 mg) by mouth 3 times a day as needed for muscle spasms.      diclofenac sodium 1 % kit Apply 4 g topically 4 times a day as needed (back pain).      glipiZIDE (Glucotrol) 5 mg tablet Take 1 tablet (5 mg) by mouth 2 times a day before meals.      ibuprofen 200 mg tablet  Take 2 tablets (400 mg) by mouth every 6 hours if needed for moderate pain (4 - 6).      liraglutide (Victoza 2-Esteban) 0.6 mg/0.1 mL (18 mg/3 mL) injection Inject 0.3 mL (1.8 mg) under the skin once daily.      losartan (Cozaar) 25 mg tablet Take 1 tablet (25 mg) by mouth once daily.      magnesium oxide (Mag-Ox) 400 mg (241.3 mg magnesium) tablet Take 1 tablet (400 mg) by mouth once daily.      meloxicam (Mobic) 15 mg tablet Take 1 tablet (15 mg) by mouth once daily as needed for moderate pain (4 - 6).      sildenafil (Viagra) 100 mg tablet Take 1 tablet (100 mg) by mouth once daily as needed for erectile dysfunction.       No current facility-administered medications for this visit.       PAST HISTORY     PAST MEDICAL HISTORY  He  has a past medical history of Axillary hidradenitis suppurativa, HLD (hyperlipidemia), HTN (hypertension), Spinal stenosis, and T2DM (type 2 diabetes mellitus) (Multi).    PAST SURGICAL HISTORY  Past Surgical History:   Procedure Laterality Date    BACK SURGERY  2008    KNEE CARTILAGE SURGERY         IMMUNIZATION HISTORY  Immunization History   Administered Date(s) Administered    Flu vaccine (IIV4), preservative free *Check age/dose* 11/05/2019, 12/14/2020, 11/18/2021, 12/13/2022    Hep B, Unspecified 05/17/2022    Hepatitis B vaccine, adult *Check Product/Dose* 09/15/2022, 12/13/2022    Moderna SARS-CoV-2 Vaccination 01/13/2022    [facility] Purple Cap SARS-CoV-2 05/20/2021, 06/10/2021    Pneumococcal conjugate vaccine, 20-valent (PREVNAR 20) 03/14/2023    Pneumococcal polysaccharide vaccine, 23-valent, age 2 years and older (PNEUMOVAX 23) 02/16/2022    Tdap vaccine, age 7 year and older (BOOSTRIX, ADACEL) 09/10/2010, 05/05/2014    Zoster vaccine, recombinant, adult (SHINGRIX) 09/15/2022       SOCIAL HISTORY  He  reports that he has quit smoking. His smoking use included cigarettes. He started smoking about 40 years ago. He has a 28.3 pack-year smoking history. He has never used smokeless  tobacco. He reports that he does not use drugs. No history on file for alcohol use.   Quit smoking in 12/2020  Began smoking age [age]. Averaged about 0.5 ppd    OCCUPATIONAL/ENVIRONMENTAL HISTORY  Previously worked as: no notable exposures  Currently works as: unemployed  DOES/DOES NOT: does not have known exposure to asbestos, silica, beryllium or inhaled metals.  DOES/DOES NOT: does not have exposure to birds or exotic animals.    FAMILY HISTORY  Family History   Problem Relation Name Age of Onset    Breast cancer Mother      Pancreatic cancer Father      Colon cancer Father      Breast cancer Sister       PHYSICAL EXAM     VITAL SIGNS: There were no vitals taken for this visit.     PREVIOUS WEIGHTS:  Wt Readings from Last 3 Encounters:   05/17/24 98.9 kg (218 lb)       Physical Exam  Vitals reviewed.   Constitutional:       General: He is not in acute distress.     Appearance: Normal appearance. He is obese. He is not ill-appearing or toxic-appearing.   HENT:      Head: Normocephalic.      Nose: No rhinorrhea.   Cardiovascular:      Rate and Rhythm: Normal rate and regular rhythm.      Heart sounds: Normal heart sounds.   Pulmonary:      Effort: Pulmonary effort is normal. No respiratory distress.      Breath sounds: Normal breath sounds. No stridor. No wheezing, rhonchi or rales.   Abdominal:      General: Abdomen is flat.   Musculoskeletal:         General: Normal range of motion.      Right lower leg: No edema.      Left lower leg: No edema.   Skin:     General: Skin is warm and dry.      Nails: There is no clubbing.   Neurological:      General: No focal deficit present.      Mental Status: He is alert and oriented to person, place, and time.   Psychiatric:         Mood and Affect: Mood normal.         Behavior: Behavior normal.         Judgment: Judgment normal.         RESULTS/DATA     Pulmonary Function Test Results     No PFT on record    Chest Radiograph   CXR  5/16/24: IMPRESSION: 1. Airspace opacity  "in the left lower lung zone, concerning for pneumonia in the appropriate clinical scenario no although could represent atelectasis.    Chest CT Scan   CTA Chest  5/16/24: IMPRESSION: Sub diagnostic contrast bolus without definite large central filling defect. The larger of the main pulmonary artery suggestive of pulmonary hypertension. Reticulonodular opacities in the right chest which may be infectious or inflammatory. Thyroid nodules measuring up to 2.3 cm, incompletely characterized on current exam. Dedicated ultrasound imaging may be obtained on a non-emergent basis if not already performed.    Echocardiogram & Cardiac Studies     No results found for this or any previous visit from the past 365 days.       Labwork & Pathology   Complete Blood Count  Lab Results   Component Value Date    WBC 7.4 05/18/2024    HGB 10.5 (L) 05/18/2024    HCT 36.0 (L) 05/18/2024    MCV 91 05/18/2024     05/18/2024       Peripheral Eosinophil Count:   Eosinophils Absolute (x10*3/uL)   Date Value   05/16/2024 0.00       Serum Immunoglobulin E:    No results found for: \"IGE\"     Metabolic Parameters  Sodium   Date/Time Value Ref Range Status   05/18/2024 05:56  136 - 145 mmol/L Final     Potassium   Date/Time Value Ref Range Status   05/18/2024 05:56 AM 3.9 3.5 - 5.3 mmol/L Final     Chloride   Date/Time Value Ref Range Status   05/18/2024 05:56  98 - 107 mmol/L Final     Bicarbonate   Date/Time Value Ref Range Status   05/18/2024 05:56 AM 22 21 - 32 mmol/L Final     Anion Gap   Date/Time Value Ref Range Status   05/18/2024 05:56 AM 15 10 - 20 mmol/L Final     Urea Nitrogen   Date/Time Value Ref Range Status   05/18/2024 05:56 AM 20 6 - 23 mg/dL Final     Creatinine   Date/Time Value Ref Range Status   05/18/2024 05:56 AM 0.94 0.50 - 1.30 mg/dL Final     Glucose   Date/Time Value Ref Range Status   05/18/2024 05:56  (H) 74 - 99 mg/dL Final     Calcium   Date/Time Value Ref Range Status   05/18/2024 05:56 AM " 8.6 8.6 - 10.6 mg/dL Final     Phosphorus   Date/Time Value Ref Range Status   05/18/2024 05:56 AM 3.6 2.5 - 4.9 mg/dL Final     Comment:     The performance characteristics of phosphorus testing in heparinized plasma have been validated by the individual  laboratory site where testing is performed. Testing on heparinized plasma is not approved by the FDA; however, such approval is not necessary.     AST   Date/Time Value Ref Range Status   05/17/2024 04:00 AM 12 9 - 39 U/L Final     ALT   Date/Time Value Ref Range Status   05/17/2024 04:00 AM 16 10 - 52 U/L Final     Comment:     Patients treated with Sulfasalazine may generate falsely decreased results for ALT.       Bronchoscopy & Pathology/Cultures       ASSESSMENT/PLAN     Mr. Garvin is a 59 y.o. male; was referred to the [facility] for evaluation of pneumonia.    Problem List and Orders  Diagnoses and all orders for this visit:  Pneumonia of right lower lobe due to infectious organism  -     Referral to Pulmonology  Former cigarette smoker  Suspected sleep apnea      Assessment and Plan / Recommendations:  Pneumonia: hospitalization from 5/17/2024 to 5/18/2024 for pneumonia and hypoxic respiratory failure.  He initially presented with malaise, fever, chills, shortness of breath and cough and was found to be hypoxic with CTA chest on 5/16/2024 concerning for a right-sided pneumonia. Patient was treated with ceftriaxone and azithromycin and only required supplemental oxygen for short period of time.  Was discharged home stable and on room air and was sent home to finish a course of Levaquin. CT imaging was fairly mild in presentation.  - Since being discharged and completing ATB course, he feels completely better.  - Asymptomatic for any pulmonary concerns at this time (no cough, SOB, VERMA, wheezing, fever, sweats, chills).  - No indication for further imaging or pulmonary studies    2. Former Cigarette Smoker: smoked from age  [age]; averaging out about 0.5 ppd; 18 total pack years.  - Does not qualify for LCS protocol due to pack year total (<20)    3. Suspected ZACHARIAH: has never done a sleep study. He does snore. Not waking up choking. Intermittent daytime fatigue. Can nod off intermittently. PCP has ordered him a sleep study; not yet completed  - Encouraged him to complete study that was ordered by PCP and follow up with her after completion    RTC PRN

## 2024-06-20 NOTE — PATIENT INSTRUCTIONS
Today we discussed your pulmonary history, recent hospitalization for pneumonia and plan moving forward.    -At this time it sounds like your previous lung infection has completely cleared and you are completely asymptomatic for any pulmonary concerns at this time  -No further imaging or testing is required  -I encourage you to complete the sleep study that was ordered by your primary care  -Continue following up with your primary care    Thank you for visiting the pulmonary clinic today! It was a pleasure to participate in your care.  Please return to clinic if needed.    Aakash Crooks, CNP  My Office Number: [phone]   CT Scheduling: [phone]  PFT/Follow Up Visit Scheduling: [phone]  My Nurse: EHSAN Easton  My : Lelia    To reach the nurse, Jemma Baxter RN, please call ([phone]) Jemma has a secure voice mail account if you want to leave a message.    **For immediate needs such as medication issues/refills, active sick symptoms/medical concerns; I ask that you please call the office and speak to the pulmonary nurse. MyChart messages do not come directly to me. There can sometimes be a delay before I am aware of any messages that were sent. Thank you.**

## 2024-07-23 ENCOUNTER — OFFICE VISIT (OUTPATIENT)
Dept: ORTHOPEDIC SURGERY | Facility: HOSPITAL | Age: 59
End: 2024-07-23
Payer: COMMERCIAL

## 2024-07-23 DIAGNOSIS — M17.0 PRIMARY OSTEOARTHRITIS OF BOTH KNEES: Primary | ICD-10-CM

## 2024-07-23 PROCEDURE — 99214 OFFICE O/P EST MOD 30 MIN: CPT | Mod: 25 | Performed by: PHYSICIAN ASSISTANT

## 2024-07-23 PROCEDURE — 2500000005 HC RX 250 GENERAL PHARMACY W/O HCPCS: Performed by: PHYSICIAN ASSISTANT

## 2024-07-23 PROCEDURE — 20610 DRAIN/INJ JOINT/BURSA W/O US: CPT | Mod: LT | Performed by: PHYSICIAN ASSISTANT

## 2024-07-23 PROCEDURE — 99214 OFFICE O/P EST MOD 30 MIN: CPT | Performed by: PHYSICIAN ASSISTANT

## 2024-07-23 PROCEDURE — 2500000004 HC RX 250 GENERAL PHARMACY W/ HCPCS (ALT 636 FOR OP/ED): Performed by: PHYSICIAN ASSISTANT

## 2024-07-23 PROCEDURE — 20610 DRAIN/INJ JOINT/BURSA W/O US: CPT | Mod: RT | Performed by: PHYSICIAN ASSISTANT

## 2024-07-23 PROCEDURE — 4010F ACE/ARB THERAPY RXD/TAKEN: CPT | Performed by: PHYSICIAN ASSISTANT

## 2024-07-23 ASSESSMENT — PAIN SCALES - GENERAL: PAINLEVEL_OUTOF10: 8

## 2024-07-23 ASSESSMENT — PAIN DESCRIPTION - DESCRIPTORS: DESCRIPTORS: ACHING;SORE

## 2024-07-23 ASSESSMENT — PAIN - FUNCTIONAL ASSESSMENT: PAIN_FUNCTIONAL_ASSESSMENT: 0-10

## 2024-07-23 NOTE — PROGRESS NOTES
Patient is a 59 y.o. male with medical history significant for   Patient Active Problem List   Diagnosis    Knee pain, bilateral    Lumbar disc disease    Lumbar radiculopathy    Primary localized osteoarthritis of hips, bilateral    Primary osteoarthritis of both knees    Pneumonia of right lower lobe due to infectious organism    Acute hypoxic respiratory failure (Multi)    Benign essential HTN    Type 2 diabetes mellitus without complication (Multi)    Former cigarette smoker    Suspected sleep apnea      who presents for follow up of his bilateral knee pain due to arthritis.  Patient states that this has caused pain for several years with symptoms continuing to worsen. Pt states that he tweaked his R knee 2 months ago while playing with his daughter and it has been more tender since then. The patient rates this pain 8/10 on the pain scale and states the symptoms are better with rest and cortisone injections; worse with daily activities including walking and stair climbing, but he does remain as active as he can with his adopted young children.  He enjoys swimming with them over the summer break, and will be going to a waterpark with them this summer, and will spend some time in a hot tub.  The patient denies any other recent injury or trauma to the knees,  denies locking or catching, denies fever/chills, N/T or calf pain. The patient has had cortisone injections in the past with good relief of their symptoms for about 1 month.     ROS: All other systems have been reviewed and are negative except as previously noted in history of present illness.    Gen: The patient is alert and oriented ×3, is in no acute distress, and appear their stated age and weight.  Psychiatric: Mood and affect are appropriate.  Eyes: Sclera are white, and pupils are round and symmetric.  ENT: Mucous membranes are moist.   Neck: Supple. Thyroid is midline.  Respiratory: Respirations are nonlabored, chest rise is symmetric.  Cardiac: Rate  is regular by palpation of distal pulses.   Abdomen: Nondistended.  Integument: No obvious cutaneous lesions are noted. No signs of lymphangitis. No signs of systemic edema.    Musculoskeletal: BAYRON knees, R knee medial joint  on palpation   skin intact, no wounds or breakdown noted  mild lower leg edema  TTP joint line lateral knees, L worse than R with valgus angulation  no knee effusion noted  compartments soft  no calf tenderness  sensation intact to light touch  motor intact including TA/GS/EHL  palpable DP/PT pulses 2+  stable to valgus/varus stress exams at 30 degrees of flexion  negative Lachmans and posterior drawer  Positive patellar crepitus  knee motion: 3-110 degrees     XR: Prior images of BAYRON knee reviewed personally by me today and reveal tricompartmental arthritis with joint space narrowing noted in lateral compartment, osteophyte formation and valgus angulation. The patient's recent knee imaging can be classified as a Grade 4 on the Kellgren Zi Scale for radiographic classification of osteoarthritis. Grade 4 is severe knee OA with large osteophytes, marked narrowing of joint space, severe sclerosis and definite deformity of bone ends.     IMP:  Problem List Items Addressed This Visit       Primary osteoarthritis of both knees - Primary                DISCUSSION: I discussed the conservative treatment options for osteoarthritis including physical therapy, NSAIDs and corticosteroid injection and briefly discussed indications for surgery. Patient should try to delay joint replacement surgery for as long as possible. If the patient's joint problem affects their quality of life and cause significant restriction of their activities, they may want to consider joint replacement surgery. I reviewed the importance for adopting a low impact lifestyle and avoidance of joint overloading activities. I explained the risks and benefits of the injection.  Patient verbalized understanding and wishes  to proceed with cortisone injection for the bilateral knees again today.     Patient ID: Winston Garvin Jr. is a 59 y.o. male.    L Inj/Asp: R knee on 7/31/2024 11:28 AM  Indications: pain  Details: 21 G needle, anterolateral approach  Medications: 5 mL lidocaine 10 mg/mL (1 %); 1 mL triamcinolone acetonide 40 mg/mL  Outcome: tolerated well, no immediate complications  Procedure, treatment alternatives, risks and benefits explained, specific risks discussed. Consent was given by the patient. Immediately prior to procedure a time out was called to verify the correct patient, procedure, equipment, support staff and site/side marked as required. Patient was prepped and draped in the usual sterile fashion.       L Inj/Asp: L knee on 7/31/2024 11:28 AM  Indications: pain  Details: 21 G needle, anterolateral approach  Medications: 5 mL lidocaine 10 mg/mL (1 %); 1 mL triamcinolone acetonide 40 mg/mL  Outcome: tolerated well, no immediate complications  Consent was given by the patient. Immediately prior to procedure a time out was called to verify the correct patient, procedure, equipment, support staff and site/side marked as required. Patient was prepped and draped in the usual sterile fashion.         PLAN:  Patient should observe for signs of infection and/or adverse reactions (redness, significant increase in pain, fever, nausea or vomiting) after receiving an injection today. If you develop any of the above symptoms, please contact my office. Patient should see the maximum effect in 3 to 5 days and can receive another cortisone injection no sooner than 3 months from today. Patient will continue with activities as tolerated and swimming over the summer is good exercise. Take Tylenol as needed.  Follow up in 3 months, no x-rays needed. All questions were answered.

## 2024-07-31 PROCEDURE — 20610 DRAIN/INJ JOINT/BURSA W/O US: CPT | Performed by: PHYSICIAN ASSISTANT

## 2024-07-31 RX ORDER — TRIAMCINOLONE ACETONIDE 40 MG/ML
1 INJECTION, SUSPENSION INTRA-ARTICULAR; INTRAMUSCULAR
Status: COMPLETED | OUTPATIENT
Start: 2024-07-31 | End: 2024-07-31

## 2024-07-31 RX ORDER — LIDOCAINE HYDROCHLORIDE 10 MG/ML
5 INJECTION INFILTRATION; PERINEURAL
Status: COMPLETED | OUTPATIENT
Start: 2024-07-31 | End: 2024-07-31

## 2024-10-28 ENCOUNTER — OFFICE VISIT (OUTPATIENT)
Dept: ORTHOPEDIC SURGERY | Facility: HOSPITAL | Age: 59
End: 2024-10-28
Payer: COMMERCIAL

## 2024-10-28 DIAGNOSIS — M17.0 PRIMARY OSTEOARTHRITIS OF BOTH KNEES: Primary | ICD-10-CM

## 2024-10-28 PROCEDURE — 4010F ACE/ARB THERAPY RXD/TAKEN: CPT | Performed by: PHYSICIAN ASSISTANT

## 2024-10-28 PROCEDURE — 20610 DRAIN/INJ JOINT/BURSA W/O US: CPT | Mod: LT | Performed by: PHYSICIAN ASSISTANT

## 2024-10-28 PROCEDURE — 99214 OFFICE O/P EST MOD 30 MIN: CPT | Mod: 25 | Performed by: PHYSICIAN ASSISTANT

## 2024-10-28 PROCEDURE — 20610 DRAIN/INJ JOINT/BURSA W/O US: CPT | Mod: RT | Performed by: PHYSICIAN ASSISTANT

## 2024-10-28 PROCEDURE — 2500000004 HC RX 250 GENERAL PHARMACY W/ HCPCS (ALT 636 FOR OP/ED): Performed by: PHYSICIAN ASSISTANT

## 2024-10-28 PROCEDURE — 99214 OFFICE O/P EST MOD 30 MIN: CPT | Performed by: PHYSICIAN ASSISTANT

## 2024-10-28 RX ORDER — LIDOCAINE HYDROCHLORIDE 10 MG/ML
5 INJECTION, SOLUTION INFILTRATION; PERINEURAL
Status: COMPLETED | OUTPATIENT
Start: 2024-10-28 | End: 2024-10-28

## 2024-10-28 RX ORDER — TRIAMCINOLONE ACETONIDE 40 MG/ML
1 INJECTION, SUSPENSION INTRA-ARTICULAR; INTRAMUSCULAR
Status: COMPLETED | OUTPATIENT
Start: 2024-10-28 | End: 2024-10-28

## 2025-01-27 ENCOUNTER — APPOINTMENT (OUTPATIENT)
Dept: ORTHOPEDIC SURGERY | Facility: HOSPITAL | Age: 60
End: 2025-01-27
Payer: COMMERCIAL

## 2025-03-03 ENCOUNTER — OFFICE VISIT (OUTPATIENT)
Dept: ORTHOPEDIC SURGERY | Facility: HOSPITAL | Age: 60
End: 2025-03-03
Payer: COMMERCIAL

## 2025-03-03 DIAGNOSIS — M17.0 PRIMARY OSTEOARTHRITIS OF BOTH KNEES: Primary | ICD-10-CM

## 2025-03-03 PROCEDURE — 99214 OFFICE O/P EST MOD 30 MIN: CPT | Performed by: PHYSICIAN ASSISTANT

## 2025-03-03 PROCEDURE — 20610 DRAIN/INJ JOINT/BURSA W/O US: CPT | Mod: RT | Performed by: PHYSICIAN ASSISTANT

## 2025-03-03 PROCEDURE — 1036F TOBACCO NON-USER: CPT | Performed by: PHYSICIAN ASSISTANT

## 2025-03-03 PROCEDURE — 4010F ACE/ARB THERAPY RXD/TAKEN: CPT | Performed by: PHYSICIAN ASSISTANT

## 2025-03-03 PROCEDURE — 2500000004 HC RX 250 GENERAL PHARMACY W/ HCPCS (ALT 636 FOR OP/ED): Mod: SE | Performed by: PHYSICIAN ASSISTANT

## 2025-03-03 PROCEDURE — 20610 DRAIN/INJ JOINT/BURSA W/O US: CPT | Mod: LT | Performed by: PHYSICIAN ASSISTANT

## 2025-03-03 PROCEDURE — 99214 OFFICE O/P EST MOD 30 MIN: CPT | Mod: 25 | Performed by: PHYSICIAN ASSISTANT

## 2025-03-03 RX ORDER — LIDOCAINE HYDROCHLORIDE 10 MG/ML
5 INJECTION, SOLUTION INFILTRATION; PERINEURAL
Status: COMPLETED | OUTPATIENT
Start: 2025-03-03 | End: 2025-03-03

## 2025-03-03 RX ORDER — TRIAMCINOLONE ACETONIDE 40 MG/ML
1 INJECTION, SUSPENSION INTRA-ARTICULAR; INTRAMUSCULAR
Status: COMPLETED | OUTPATIENT
Start: 2025-03-03 | End: 2025-03-03

## 2025-03-03 RX ADMIN — TRIAMCINOLONE ACETONIDE 1 ML: 400 INJECTION, SUSPENSION INTRA-ARTICULAR; INTRAMUSCULAR at 21:28

## 2025-03-03 RX ADMIN — LIDOCAINE HYDROCHLORIDE 5 ML: 10 INJECTION, SOLUTION INFILTRATION; PERINEURAL at 21:28

## 2025-03-04 NOTE — PROGRESS NOTES
Patient is a 59 y.o. male with medical history significant for   Patient Active Problem List   Diagnosis    Knee pain, bilateral    Lumbar disc disease    Lumbar radiculopathy    Primary localized osteoarthritis of hips, bilateral    Primary osteoarthritis of both knees    Pneumonia of right lower lobe due to infectious organism    Acute hypoxic respiratory failure (Multi)    Benign essential HTN    Type 2 diabetes mellitus without complication (Multi)    Former cigarette smoker    Suspected sleep apnea      who presents for follow up of his bilateral knee pain due to arthritis.  Patient has had knee pain from arthritis for several years with symptoms continuing to worsen. Pt states that he remains as active as he can with his adopted young children, enjoys taking them to [employer] every spring break.  The patient denies any other recent injury or trauma to the knees,  denies locking or catching, denies fever/chills, N/T or calf pain. The patient has had cortisone injections in the past with good relief of their symptoms and he is agreeable to repeating them again today.     ROS: All other systems have been reviewed and are negative except as previously noted in history of present illness.    Gen: The patient is alert and oriented ×3, is in no acute distress, and appear their stated age and weight.  Psychiatric: Mood and affect are appropriate.  Eyes: Sclera are white, and pupils are round and symmetric.  ENT: Mucous membranes are moist.   Neck: Supple. Thyroid is midline.  Respiratory: Respirations are nonlabored, chest rise is symmetric.  Cardiac: Rate is regular by palpation of distal pulses.   Abdomen: Nondistended.  Integument: No obvious cutaneous lesions are noted. No signs of lymphangitis. No signs of systemic edema.    Musculoskeletal: BAYRON knees, R knee medial joint  on palpation   skin intact, no wounds or breakdown noted  mild lower leg edema  TTP joint line lateral knees, L worse  than R with valgus angulation  no knee effusion noted  compartments soft  no calf tenderness  sensation intact to light touch  motor intact including TA/GS/EHL  palpable DP/PT pulses 2+  stable to valgus/varus stress exams at 30 degrees of flexion  Positive patellar crepitus  knee motion: 3-110 degrees     XR: Prior images of BAYRON knee reviewed personally by me today and reveal tricompartmental arthritis with joint space narrowing noted in lateral compartment, osteophyte formation and valgus angulation. The patient's recent knee imaging can be classified as a Grade 4 on the Kellgren Zi Scale for radiographic classification of osteoarthritis. Grade 4 is severe knee OA with large osteophytes, marked narrowing of joint space, severe sclerosis and definite deformity of bone ends.     IMP:  Problem List Items Addressed This Visit       Primary osteoarthritis of both knees - Primary         DISCUSSION: I discussed the conservative treatment options for osteoarthritis including physical therapy, NSAIDs and corticosteroid injection and briefly discussed indications for surgery. Patient should try to delay joint replacement surgery for as long as possible. If the patient's joint problem affects their quality of life and cause significant restriction of their activities, they may want to consider joint replacement surgery. I reviewed the importance for adopting a low impact lifestyle and avoidance of joint overloading activities. I explained the risks and benefits of the injection.  Patient verbalized understanding and wishes to proceed with cortisone injection for the bilateral knees again today.     Patient ID: Winston Garvin Jr. is a 59 y.o. male.    L Inj/Asp: R knee on 3/3/2025 9:28 PM  Indications: pain  Details: 21 G needle, anterolateral approach  Medications: 5 mL lidocaine 10 mg/mL (1 %); 1 mL triamcinolone acetonide 40 mg/mL  Outcome: tolerated well, no immediate complications  Procedure, treatment alternatives,  risks and benefits explained, specific risks discussed. Consent was given by the patient. Immediately prior to procedure a time out was called to verify the correct patient, procedure, equipment, support staff and site/side marked as required. Patient was prepped and draped in the usual sterile fashion.       L Inj/Asp: L knee on 3/3/2025 9:28 PM  Indications: pain  Details: 21 G needle, anterolateral approach  Medications: 5 mL lidocaine 10 mg/mL (1 %); 1 mL triamcinolone acetonide 40 mg/mL  Outcome: tolerated well, no immediate complications  Consent was given by the patient. Immediately prior to procedure a time out was called to verify the correct patient, procedure, equipment, support staff and site/side marked as required. Patient was prepped and draped in the usual sterile fashion.         PLAN:  Patient should observe for signs of infection and/or adverse reactions (redness, significant increase in pain, fever, nausea or vomiting) after receiving an injection today. If you develop any of the above symptoms, please contact my office. Patient should see the maximum effect in 3 to 5 days and can receive another cortisone injection no sooner than 3 months from today. Patient will continue with activities to maintain strength and reduce stiffness. Take Tylenol as needed.  Follow up in 3 months, no x-rays needed. All questions were answered.

## 2025-06-02 ENCOUNTER — APPOINTMENT (OUTPATIENT)
Dept: ORTHOPEDIC SURGERY | Facility: HOSPITAL | Age: 60
End: 2025-06-02
Payer: COMMERCIAL

## 2025-06-09 ENCOUNTER — APPOINTMENT (OUTPATIENT)
Dept: ORTHOPEDIC SURGERY | Facility: HOSPITAL | Age: 60
End: 2025-06-09
Payer: COMMERCIAL

## 2025-06-12 ENCOUNTER — OFFICE VISIT (OUTPATIENT)
Dept: ORTHOPEDIC SURGERY | Facility: HOSPITAL | Age: 60
End: 2025-06-12
Payer: COMMERCIAL

## 2025-06-12 ENCOUNTER — HOSPITAL ENCOUNTER (OUTPATIENT)
Dept: RADIOLOGY | Facility: HOSPITAL | Age: 60
Discharge: HOME | End: 2025-06-12
Payer: COMMERCIAL

## 2025-06-12 VITALS — WEIGHT: 214.8 LBS | HEIGHT: 66 IN | BODY MASS INDEX: 34.52 KG/M2

## 2025-06-12 DIAGNOSIS — M17.0 PRIMARY OSTEOARTHRITIS OF BOTH KNEES: ICD-10-CM

## 2025-06-12 DIAGNOSIS — B05.9 MEASLES: ICD-10-CM

## 2025-06-12 DIAGNOSIS — Z11.59 MEASLES SCREENING: ICD-10-CM

## 2025-06-12 PROCEDURE — 99202 OFFICE O/P NEW SF 15 MIN: CPT | Mod: 25 | Performed by: ORTHOPAEDIC SURGERY

## 2025-06-12 PROCEDURE — 73564 X-RAY EXAM KNEE 4 OR MORE: CPT | Mod: BILATERAL PROCEDURE | Performed by: RADIOLOGY

## 2025-06-12 PROCEDURE — 73564 X-RAY EXAM KNEE 4 OR MORE: CPT | Mod: 50

## 2025-06-12 PROCEDURE — 2500000004 HC RX 250 GENERAL PHARMACY W/ HCPCS (ALT 636 FOR OP/ED): Performed by: ORTHOPAEDIC SURGERY

## 2025-06-12 PROCEDURE — 20610 DRAIN/INJ JOINT/BURSA W/O US: CPT | Mod: LT | Performed by: ORTHOPAEDIC SURGERY

## 2025-06-12 PROCEDURE — 20610 DRAIN/INJ JOINT/BURSA W/O US: CPT | Mod: RT | Performed by: ORTHOPAEDIC SURGERY

## 2025-06-12 RX ORDER — TRIAMCINOLONE ACETONIDE 40 MG/ML
40 INJECTION, SUSPENSION INTRA-ARTICULAR; INTRAMUSCULAR
Status: COMPLETED | OUTPATIENT
Start: 2025-06-12 | End: 2025-06-12

## 2025-06-12 RX ORDER — LIDOCAINE HYDROCHLORIDE 10 MG/ML
4 INJECTION, SOLUTION INFILTRATION; PERINEURAL
Status: COMPLETED | OUTPATIENT
Start: 2025-06-12 | End: 2025-06-12

## 2025-06-12 RX ADMIN — TRIAMCINOLONE ACETONIDE 40 MG: 40 INJECTION, SUSPENSION INTRA-ARTICULAR; INTRAMUSCULAR at 15:54

## 2025-06-12 RX ADMIN — LIDOCAINE HYDROCHLORIDE 4 ML: 10 INJECTION, SOLUTION INFILTRATION; PERINEURAL at 15:54

## 2025-06-12 ASSESSMENT — PAIN - FUNCTIONAL ASSESSMENT: PAIN_FUNCTIONAL_ASSESSMENT: 0-10

## 2025-06-12 ASSESSMENT — PAIN SCALES - GENERAL: PAINLEVEL_OUTOF10: 8

## 2025-06-12 NOTE — PATIENT INSTRUCTIONS
BMI was above normal measurement. Current weight: 97.4 kg (214 lb 12.8 oz)  Weight change since last visit (-) denotes wt loss -11.2 lbs   Weight loss needed to achieve BMI 25: 61.4 Lbs  Weight loss needed to achieve BMI 30: 30.7 Lbs  Advised to Increase physical activity.

## 2025-06-12 NOTE — PROGRESS NOTES
PRIMARY CARE PHYSICIAN: Willa Chan MD  REFERRING PROVIDER: No referring provider defined for this encounter.     CONSULT ORTHOPAEDIC: Knee Evaluation        ASSESSMENT & PLAN    IMPRESSION:   1.  Primary arthritis, bilateral knees    PLAN:   Discussed the patient's findings above, reviewed his current x-rays today.  He does have severe end-stage lateral compartment arthritis and at this point is failing conservative treatment.  We discussed conservative treatment options versus surgical intervention at this point given his social situation and taking care of his family is unable to pursue surgical intervention and would like to just repeat injections today if possible.  Will continue work on weight loss and activity modification, continue take over-the-counter medications in the form of anti-inflammatories as needed for pain control.  Discussed the risk and benefits regarding injection may repeat every 3 to 4 months as needed.  Stable for injection details.    L Inj/Asp: L knee on 6/12/2025 3:54 PM  Indications: pain  Details: 25 G needle, anterolateral (Inferolateral) approach  Medications: 40 mg triamcinolone acetonide 40 mg/mL; 4 mL lidocaine 10 mg/mL (1 %)  Outcome: tolerated well, no immediate complications    Prepped with alcohol. Patient tolerated injection well. Band-aid applied to injection site.   Procedure, treatment alternatives, risks and benefits explained, specific risks discussed. Consent was given by the patient. Immediately prior to procedure a time out was called to verify the correct patient, procedure, equipment, support staff and site/side marked as required.       L Inj/Asp: R knee on 6/12/2025 3:54 PM  Indications: pain  Details: 25 G needle, anterolateral (Inferolateral) approach  Medications: 40 mg triamcinolone acetonide 40 mg/mL; 4 mL lidocaine 10 mg/mL (1 %)  Outcome: tolerated well, no immediate complications    Prepped with alcohol, bandaid applied to injection site.    Procedure, treatment alternatives, risks and benefits explained, specific risks discussed. Consent was given by the patient.             SUBJECTIVE  CHIEF COMPLAINT:   Chief Complaint   Patient presents with    Left Knee - Pain    Right Knee - Pain        HPI: Winsotn Garvin Jr. is a 60 y.o. patient. Winston Garvin Jr. has had progressive problems with their both knees over the past 10 years for the left knee and over the past 5 years for the right knee. L knee is worse. They do not report any history of trauma to the area(s). They do reports some numbness and/or tingling in their legs and feet. Their symptoms that are interfering with their daily life include localized anterior sided pain, crepitus, swelling and instability in both knees. Right knee does have some medial sided knee pain. Worse with ambulation. XR done today. Of note, they do not have an updated list of their medications with them, but they will bring an updated list during their next visit.     They state that they missed their appointment with Monica Eagle PA-C, and never received CSI's. However, on [facility] they missed an appoitment in 1/2025 and were given B/L CSI on 3/3/2025.  Overall he is responding well to shots but the shots have worn off and his pain is severely worse especially in the left knee.    FUNCTIONAL STATUS: occasionally limited.  AMBULATORY STATUS: Independent  PREVIOUS TREATMENTS: NSAIDS Advil with no improvement, completed  Cortisone injection B/L knee with mild improvement,    Therapy PT many years ago completed, with no improvement  Bracing: knee sleeve and patella stabilizing brace with mild improvement  Surgery L knee Sx's in the 70's and 90's with good improvement  Analgesics Tylenol completed with no help  HISTORY OF SURGERY ON AFFECTED KNEE(S): Yes, L knee Sx in the 1970's and a scope in the 1990's   PREVIOUS NOTES REVIEWED: Previous office notes for nonoperative management of bilateral knee  osteoarthritis.r      REVIEW OF SYSTEMS  Constitutional: See HPI for pain assessment, No significant weight loss, recent trauma  Cardiovascular: No chest pain, shortness of breath  Respiratory: No difficulty breathing, cough  Gastrointestinal: No nausea, vomiting, diarrhea, constipation  Musculoskeletal: Noted in HPI, positive for pain, restricted motion, stiffness  Integumentary: No rashes, easy bruising, redness   Neurological: no numbness or tingling in extremities, no gait disturbances   Psychiatric: No mood changes, memory changes, social issues  Heme/Lymph: no excessive swelling, easy bruising, excessive bleeding  ENT: No hearing changes  Eyes: No vision changes    Medical History[1]     Allergies[2]     Surgical History[3]     Family History[4]     Social History     Socioeconomic History    Marital status:      Spouse name: Not on file    Number of children: Not on file    Years of education: Not on file    Highest education level: Not on file   Occupational History    Not on file   Tobacco Use    Smoking status: Former     Current packs/day: 0.50     Average packs/day: 0.5 packs/day for 37.0 years (18.5 ttl pk-yrs)     Types: Cigarettes     Start date: 6/20/1988    Smokeless tobacco: Never    Tobacco comments:     Quit 12/2020   Substance and Sexual Activity    Alcohol use: Not Currently     Comment: 25 yrs ago    Drug use: Not on file     Comment: every now and again    Sexual activity: Not on file   Other Topics Concern    Not on file   Social History Narrative    Not on file     Social Drivers of Health     Financial Resource Strain: High Risk (1/22/2025)    Received from [employer] [employer]    Overall Financial Resource Strain (CARDIA)     Difficulty of Paying Living Expenses: Hard   Food Insecurity: Food Insecurity Present (1/22/2025)    Received from [employer]    Hunger Vital Sign     Worried About Running Out of Food in the Last Year: Sometimes true     Ran Out of Food in the Last Year: Sometimes  true   Transportation Needs: No Transportation Needs (1/22/2025)    Received from [employer]    PRAPARE - Transportation     Lack of Transportation (Medical): No     Lack of Transportation (Non-Medical): No   Physical Activity: Insufficiently Active (1/22/2025)    Received from [employer]    Exercise Vital Sign     Days of Exercise per Week: 1 day     Minutes of Exercise per Session: 10 min   Stress: No Stress Concern Present (1/22/2025)    Received from [employer]    [place] [employer] - Occupational Stress Questionnaire     Feeling of Stress : Only a little   Social Connections: Unknown (1/22/2025)    Received from [employer]    Social Connection and Isolation Panel [NHANES]     Frequency of Communication with Friends and Family: Once a week     Frequency of Social Gatherings with Friends and Family: Patient declined     Attends [employer] Services: Never     Active Member of Clubs or Organizations: No     Attends Club or Organization Meetings: Never     Marital Status:    Intimate Partner Violence: Not At Risk (1/22/2025)    Received from [employer]    Humiliation, Afraid, Rape, and Kick questionnaire     Fear of Current or Ex-Partner: No     Emotionally Abused: No     Physically Abused: No     Sexually Abused: No   Housing Stability: Low Risk  (1/22/2025)    Received from [employer]    Housing Stability Vital Sign     Unable to Pay for Housing in the Last Year: No     Number of Times Moved in the Last Year: 0     Homeless in the Last Year: No        CURRENT MEDICATIONS:   Current Medications[5]     OBJECTIVE    PHYSICAL EXAM      5/17/2024     4:04 PM 5/17/2024     4:39 PM 5/17/2024     5:00 PM 5/17/2024     8:24 PM 5/18/2024     5:29 AM 6/20/2024    10:07 AM 6/12/2025     2:36 PM   Vitals   Systolic 153 147  172 146 135    Diastolic 84 73  79 80 81    Heart Rate 76 81  111 90 82    Temp 36.3 °C (97.3 °F) 36.4 °C (97.5 °F)  36.4 °C (97.5 °F) 36.2 °C (97.2 °F) 36.3 °C (97.3 °F)   "  Resp 16 18  18 18     Height   1.675 m (5' 5.95\")    1.67 m (5' 5.75\")   Weight (lb)   218   226 214.8   BMI   35.24 kg/m2   36.54 kg/m2 34.94 kg/m2   BSA (m2)   2.15 m2   2.19 m2 2.13 m2   Visit Report      Report Report      Body mass index is 34.94 kg/m².    GENERAL: A/Ox3, NAD. Appears healthy, well nourished  PSYCHIATRIC: Mood stable, appropriate memory recall  EYES: EOM intact, no scleral icterus  CARDIAC: regular rate  LUNGS: Breathing non-labored  SKIN: no erythema, rashes, or ecchymoses     MUSCULOSKELETAL:  Laterality: bilateral Knee Exam  - Alignment: partial correctible valgus deformity  - ROM: 5 to 115 degrees  - Effusion: none  - Strength: knee extension and flexion 5/5, EHL/PF/DF motor intact  - Palpation: TTP along lateral joint line  - Stability: Anterior/Posterior stable, varus/valgus stable  - Gait: normal  - Hip Exam: flexion to 100+ degrees, full extension, internal/external rotation adequate, and no pain with log roll  - Special Tests: none performed      NEUROVASCULAR:  - Neurovascular Status: sensation intact to light touch distally  - Capillary refill brisk at extremities, Bilateral dorsalis pedis pulse 2+     IMAGING:  Multiple views of the affected bilateral knee(s) demonstrate: Severe lateral compartment arthritis with complete joint space narrowing, subchondral sclerosis, marginal osteophytic change and valgus deformity.   X-rays were personally reviewed and interpreted by me.  Radiology reports were reviewed by me as well, if readily available at the time.    ** Voice Recognition software was used to dictate this note. Please be aware that minor errors in the transcription may be present **    Rosalia Urban DO  Attending Surgeon  [facility] Re[facility] Adult Re[facility] Surgery  [place], [place]          [1]   Past Medical History:  Diagnosis Date    Axillary hidradenitis suppurativa     HLD (hyperlipidemia)     HTN (hypertension)     Spinal stenosis     T2DM " "(type 2 diabetes mellitus) (Multi)    [2]   Allergies  Allergen Reactions    Amlodipine Swelling     Leg swelling    Losartan Headache    Lisinopril Cough   [3]   Past Surgical History:  Procedure Laterality Date    BACK SURGERY  2008    KNEE CARTILAGE SURGERY     [4]   Family History  Problem Relation Name Age of Onset    Breast cancer Mother      Pancreatic cancer Father      Colon cancer Father      Breast cancer Sister     [5]   Current Outpatient Medications   Medication Sig Dispense Refill    acetaminophen (Tylenol) 500 mg tablet Take by mouth every 6 hours if needed for mild pain (1 - 3).      albuterol (Proventil HFA) 90 mcg/actuation inhaler Inhale 2 puffs every 4 hours if needed for wheezing or shortness of breath. 6.7 g 0    amLODIPine (Norvasc) 10 mg tablet Take 1 tablet (10 mg) by mouth once daily.      atorvastatin (Lipitor) 40 mg tablet Take 1 tablet (40 mg) by mouth once daily.      blood-glucose meter misc       diclofenac sodium (Voltaren) 1 % gel       doxycycline (Monodox) 100 mg capsule Take 1 capsule (100 mg) by mouth every 12 hours.      Droplet Pen Needle 31 gauge x 3/16\" needle USE AS DIRECTED once daily      gabapentin (Neurontin) 600 mg tablet Take 1 tablet (600 mg) by mouth 2 times a day.      glipiZIDE (Glucotrol) 5 mg tablet Take 1 tablet (5 mg) by mouth 2 times a day before meals.      ibuprofen 200 mg tablet Take 2 tablets (400 mg) by mouth every 6 hours if needed for moderate pain (4 - 6).      liraglutide (Victoza 2-Esteban) 0.6 mg/0.1 mL (18 mg/3 mL) injection Inject 0.3 mL (1.8 mg) under the skin once daily.      losartan (Cozaar) 25 mg tablet Take 1 tablet (25 mg) by mouth once daily.      magnesium oxide (Mag-Ox) 400 mg (241.3 mg magnesium) tablet Take 1 tablet (400 mg) by mouth once daily.      sildenafil (Viagra) 100 mg tablet Take 1 tablet (100 mg) by mouth once daily as needed for erectile dysfunction.       No current facility-administered medications for this visit.     "

## 2025-06-12 NOTE — LETTER
June 12, 2025     Willa Chan MD  [address] [place] [address] [place] [address]    Patient: Winston Garvin Jr.   YOB: 1965   Date of Visit: 6/12/2025       Dear Dr. Willa Chan MD:    Thank you for referring Winston Garvin to me for evaluation. Below are my notes for this consultation.  If you have questions, please do not hesitate to call me. I look forward to following your patient along with you.       Sincerely,     Rosalia rUban,       CC: No Recipients  ______________________________________________________________________________________    PRIMARY CARE PHYSICIAN: Willa Chan MD  REFERRING PROVIDER: No referring provider defined for this encounter.     CONSULT ORTHOPAEDIC: Knee Evaluation        ASSESSMENT & PLAN    IMPRESSION:   1.  Primary arthritis, bilateral knees    PLAN:   Discussed the patient's findings above, reviewed his current x-rays today.  He does have severe end-stage lateral compartment arthritis and at this point is failing conservative treatment.  We discussed conservative treatment options versus surgical intervention at this point given his social situation and taking care of his family is unable to pursue surgical intervention and would like to just repeat injections today if possible.  Will continue work on weight loss and activity modification, continue take over-the-counter medications in the form of anti-inflammatories as needed for pain control.  Discussed the risk and benefits regarding injection may repeat every 3 to 4 months as needed.  Stable for injection details.    L Inj/Asp: L knee on 6/12/2025 3:54 PM  Indications: pain  Details: 25 G needle, anterolateral (Inferolateral) approach  Medications: 40 mg triamcinolone acetonide 40 mg/mL; 4 mL lidocaine 10 mg/mL (1 %)  Outcome: tolerated well, no immediate complications    Prepped with alcohol. Patient tolerated injection well. Band-aid applied to injection site.   Procedure, treatment  alternatives, risks and benefits explained, specific risks discussed. Consent was given by the patient. Immediately prior to procedure a time out was called to verify the correct patient, procedure, equipment, support staff and site/side marked as required.       L Inj/Asp: R knee on 6/12/2025 3:54 PM  Indications: pain  Details: 25 G needle, anterolateral (Inferolateral) approach  Medications: 40 mg triamcinolone acetonide 40 mg/mL; 4 mL lidocaine 10 mg/mL (1 %)  Outcome: tolerated well, no immediate complications    Prepped with alcohol, bandaid applied to injection site.   Procedure, treatment alternatives, risks and benefits explained, specific risks discussed. Consent was given by the patient.             SUBJECTIVE  CHIEF COMPLAINT:   Chief Complaint   Patient presents with   • Left Knee - Pain   • Right Knee - Pain        HPI: Winston Garvin Jr. is a 60 y.o. patient. Winston Garvin Jr. has had progressive problems with their both knees over the past 10 years for the left knee and over the past 5 years for the right knee. L knee is worse. They do not report any history of trauma to the area(s). They do reports some numbness and/or tingling in their legs and feet. Their symptoms that are interfering with their daily life include localized anterior sided pain, crepitus, swelling and instability in both knees. Right knee does have some medial sided knee pain. Worse with ambulation. XR done today. Of note, they do not have an updated list of their medications with them, but they will bring an updated list during their next visit.     They state that they missed their appointment with Monica Eagle PA-C, and never received CSI's. However, on [facility] they missed an appoitment in 1/2025 and were given B/L CSI on 3/3/2025.  Overall he is responding well to shots but the shots have worn off and his pain is severely worse especially in the left knee.    FUNCTIONAL STATUS: occasionally limited.  AMBULATORY STATUS:  Independent  PREVIOUS TREATMENTS: NSAIDS Advil with no improvement, completed  Cortisone injection B/L knee with mild improvement,    Therapy PT many years ago completed, with no improvement  Bracing: knee sleeve and patella stabilizing brace with mild improvement  Surgery L knee Sx's in the 70's and 90's with good improvement  Analgesics Tylenol completed with no help  HISTORY OF SURGERY ON AFFECTED KNEE(S): Yes, L knee Sx in the 1970's and a scope in the 1990's   PREVIOUS NOTES REVIEWED: Previous office notes for nonoperative management of bilateral knee osteoarthritis.r      REVIEW OF SYSTEMS  Constitutional: See HPI for pain assessment, No significant weight loss, recent trauma  Cardiovascular: No chest pain, shortness of breath  Respiratory: No difficulty breathing, cough  Gastrointestinal: No nausea, vomiting, diarrhea, constipation  Musculoskeletal: Noted in HPI, positive for pain, restricted motion, stiffness  Integumentary: No rashes, easy bruising, redness   Neurological: no numbness or tingling in extremities, no gait disturbances   Psychiatric: No mood changes, memory changes, social issues  Heme/Lymph: no excessive swelling, easy bruising, excessive bleeding  ENT: No hearing changes  Eyes: No vision changes    Medical History[1]     Allergies[2]     Surgical History[3]     Family History[4]     Social History     Socioeconomic History   • Marital status:      Spouse name: Not on file   • Number of children: Not on file   • Years of education: Not on file   • Highest education level: Not on file   Occupational History   • Not on file   Tobacco Use   • Smoking status: Former     Current packs/day: 0.50     Average packs/day: 0.5 packs/day for 37.0 years (18.5 ttl pk-yrs)     Types: Cigarettes     Start date: 6/20/1988   • Smokeless tobacco: Never   • Tobacco comments:     Quit 12/2020   Substance and Sexual Activity   • Alcohol use: Not Currently     Comment: 25 yrs ago   • Drug use: Not on file      Comment: every now and again   • Sexual activity: Not on file   Other Topics Concern   • Not on file   Social History Narrative   • Not on file     Social Drivers of Health     Financial Resource Strain: High Risk (1/22/2025)    Received from [facility]    Overall Financial Resource Strain (CARDIA)    • Difficulty of Paying Living Expenses: Hard   Food Insecurity: Food Insecurity Present (1/22/2025)    Received from [facility]    Hunger Vital Sign    • Worried About Running Out of Food in the Last Year: Sometimes true    • Ran Out of Food in the Last Year: Sometimes true   Transportation Needs: No Transportation Needs (1/22/2025)    Received from [facility]    PRAPARE - Transportation    • Lack of Transportation (Medical): No    • Lack of Transportation (Non-Medical): No   Physical Activity: Insufficiently Active (1/22/2025)    Received from [facility]    Exercise Vital Sign    • Days of Exercise per Week: 1 day    • Minutes of Exercise per Session: 10 min   Stress: No Stress Concern Present (1/22/2025)    Received from [facility]    [employer] [place] [employer] - Occupational Stress Questionnaire    • Feeling of Stress : Only a little   Social Connections: Unknown (1/22/2025)    Received from [facility]    Social Connection and Isolation Panel [NHANES]    • Frequency of Communication with Friends and Family: Once a week    • Frequency of Social Gatherings with Friends and Family: Patient declined    • Attends [employer] Services: Never    • Active Member of Clubs or Organizations: No    • Attends Club or Organization Meetings: Never    • Marital Status:    Intimate Partner Violence: Not At Risk (1/22/2025)    Received from [facility]    Humiliation, Afraid, Rape, and Kick questionnaire    • Fear of Current or Ex-Partner: No    • Emotionally Abused: No    • Physically Abused: No    • Sexually Abused: No   Housing Stability: Low Risk  (1/22/2025)    Received from [facility]    Housing  "Stability Vital Sign    • Unable to Pay for Housing in the Last Year: No    • Number of Times Moved in the Last Year: 0    • Homeless in the Last Year: No        CURRENT MEDICATIONS:   Current Medications[5]     OBJECTIVE    PHYSICAL EXAM      5/17/2024     4:04 PM 5/17/2024     4:39 PM 5/17/2024     5:00 PM 5/17/2024     8:24 PM 5/18/2024     5:29 AM 6/20/2024    10:07 AM 6/12/2025     2:36 PM   Vitals   Systolic 153 147  172 146 135    Diastolic 84 73  79 80 81    Heart Rate 76 81  111 90 82    Temp 36.3 °C (97.3 °F) 36.4 °C (97.5 °F)  36.4 °C (97.5 °F) 36.2 °C (97.2 °F) 36.3 °C (97.3 °F)    Resp 16 18  18 18     Height   1.675 m (5' 5.95\")    1.67 m (5' 5.75\")   Weight (lb)   218   226 214.8   BMI   35.24 kg/m2   36.54 kg/m2 34.94 kg/m2   BSA (m2)   2.15 m2   2.19 m2 2.13 m2   Visit Report      Report Report      Body mass index is 34.94 kg/m².    GENERAL: A/Ox3, NAD. Appears healthy, well nourished  PSYCHIATRIC: Mood stable, appropriate memory recall  EYES: EOM intact, no scleral icterus  CARDIAC: regular rate  LUNGS: Breathing non-labored  SKIN: no erythema, rashes, or ecchymoses     MUSCULOSKELETAL:  Laterality: bilateral Knee Exam  - Alignment: partial correctible valgus deformity  - ROM: 5 to 115 degrees  - Effusion: none  - Strength: knee extension and flexion 5/5, EHL/PF/DF motor intact  - Palpation: TTP along lateral joint line  - Stability: Anterior/Posterior stable, varus/valgus stable  - Gait: normal  - Hip Exam: flexion to 100+ degrees, full extension, internal/external rotation adequate, and no pain with log roll  - Special Tests: none performed      NEUROVASCULAR:  - Neurovascular Status: sensation intact to light touch distally  - Capillary refill brisk at extremities, Bilateral dorsalis pedis pulse 2+     IMAGING:  Multiple views of the affected bilateral knee(s) demonstrate: Severe lateral compartment arthritis with complete joint space narrowing, subchondral sclerosis, marginal osteophytic " "change and valgus deformity.   X-rays were personally reviewed and interpreted by me.  Radiology reports were reviewed by me as well, if readily available at the time.    ** Voice Recognition software was used to dictate this note. Please be aware that minor errors in the transcription may be present **    Rosalia Urban DO  Attending Surgeon  Joint Replacement and Adult Reconstructive Surgery  [place], [place]          [1]  Past Medical History:  Diagnosis Date   • Axillary hidradenitis suppurativa    • HLD (hyperlipidemia)    • HTN (hypertension)    • Spinal stenosis    • T2DM (type 2 diabetes mellitus) (Multi)    [2]  Allergies  Allergen Reactions   • Amlodipine Swelling     Leg swelling   • Losartan Headache   • Lisinopril Cough   [3]  Past Surgical History:  Procedure Laterality Date   • BACK SURGERY  2008   • KNEE CARTILAGE SURGERY     [4]  Family History  Problem Relation Name Age of Onset   • Breast cancer Mother     • Pancreatic cancer Father     • Colon cancer Father     • Breast cancer Sister     [5]  Current Outpatient Medications   Medication Sig Dispense Refill   • acetaminophen (Tylenol) 500 mg tablet Take by mouth every 6 hours if needed for mild pain (1 - 3).     • albuterol (Proventil HFA) 90 mcg/actuation inhaler Inhale 2 puffs every 4 hours if needed for wheezing or shortness of breath. 6.7 g 0   • amLODIPine (Norvasc) 10 mg tablet Take 1 tablet (10 mg) by mouth once daily.     • atorvastatin (Lipitor) 40 mg tablet Take 1 tablet (40 mg) by mouth once daily.     • blood-glucose meter misc      • diclofenac sodium (Voltaren) 1 % gel      • doxycycline (Monodox) 100 mg capsule Take 1 capsule (100 mg) by mouth every 12 hours.     • Droplet Pen Needle 31 gauge x 3/16\" needle USE AS DIRECTED once daily     • gabapentin (Neurontin) 600 mg tablet Take 1 tablet (600 mg) by mouth 2 times a day.     • glipiZIDE (Glucotrol) 5 mg tablet Take 1 tablet (5 mg) by mouth 2 times a day before " meals.     • ibuprofen 200 mg tablet Take 2 tablets (400 mg) by mouth every 6 hours if needed for moderate pain (4 - 6).     • liraglutide (Victoza 2-Esteban) 0.6 mg/0.1 mL (18 mg/3 mL) injection Inject 0.3 mL (1.8 mg) under the skin once daily.     • losartan (Cozaar) 25 mg tablet Take 1 tablet (25 mg) by mouth once daily.     • magnesium oxide (Mag-Ox) 400 mg (241.3 mg magnesium) tablet Take 1 tablet (400 mg) by mouth once daily.     • sildenafil (Viagra) 100 mg tablet Take 1 tablet (100 mg) by mouth once daily as needed for erectile dysfunction.       No current facility-administered medications for this visit.    no